# Patient Record
Sex: FEMALE | Race: BLACK OR AFRICAN AMERICAN | NOT HISPANIC OR LATINO | Employment: UNEMPLOYED | URBAN - METROPOLITAN AREA
[De-identification: names, ages, dates, MRNs, and addresses within clinical notes are randomized per-mention and may not be internally consistent; named-entity substitution may affect disease eponyms.]

---

## 2023-06-22 ENCOUNTER — TELEMEDICINE (OUTPATIENT)
Dept: PRIMARY CARE | Facility: CLINIC | Age: 43
End: 2023-06-22
Payer: COMMERCIAL

## 2023-06-22 DIAGNOSIS — R29.818 SUSPECTED SLEEP APNEA: ICD-10-CM

## 2023-06-22 DIAGNOSIS — M54.50 ACUTE RIGHT-SIDED LOW BACK PAIN WITHOUT SCIATICA: Primary | ICD-10-CM

## 2023-06-22 PROCEDURE — 99214 OFFICE O/P EST MOD 30 MIN: CPT | Performed by: STUDENT IN AN ORGANIZED HEALTH CARE EDUCATION/TRAINING PROGRAM

## 2023-06-22 RX ORDER — METHYLPREDNISOLONE 4 MG/1
TABLET ORAL
Qty: 21 TABLET | Refills: 0 | Status: SHIPPED | OUTPATIENT
Start: 2023-06-22 | End: 2023-06-29

## 2023-06-22 NOTE — PROGRESS NOTES
Subjective   Patient ID: Jody Richardson is a 42 y.o. female who presents for multi issues.    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    Re: back pain - Subacute to chronic onset of low back pain. Mild-moderate in intensity, locks up on occasion. Pain is in lumbar area, with no radiation into the buttock. Used some conservative therapy (NSAIDs, stretching, heat/ice, etc...) but without regularity. Has not yet tried physical therapy.     Re: c/f JOAN - BMI ~30. Feels not well rested. Told she snores and stops breathing when she sleeps; often wakes up during the night and feels a bit short of breath. Requesting sleep study.      PMHx, FHx, Social Hx, Surg Hx personally reviewed at this appointment. No pertinent findings and/or changes from prior (if applicable).     ROS: Denies wt gain/loss f/c HA LoC CP SOB NVDC. See HPI above, and scanned sheet (if applicable). All other systems are reviewed and are without complaint.       Review of Systems    Objective   There were no vitals taken for this visit.    Physical Exam  Gen: Well appearing, AAO x 3.   HEENT: NC/AT. Symmetric pupils on webcam.   Pulm: no evidence of increased work of breathing on webcam  Back: no rash/lesions on her back. Complains of tenderness R lower back and points to this region.  Skin: no blemishes or rashes on webcam     Assessment/Plan   # Suspected JOAN  - sleep study ordered    # back strain  - conservative management with PT, massage therapy, acupuncture and topical creams if amenable  - recommend weight loss with diet and exercise  - trial of medrol dosepak or PRN cyclobenzaprine if necessary  - if above ineffective, will re-evaluate and consider imaging at that time

## 2023-08-21 ENCOUNTER — TELEMEDICINE (OUTPATIENT)
Dept: PRIMARY CARE | Facility: CLINIC | Age: 43
End: 2023-08-21
Payer: COMMERCIAL

## 2023-08-21 DIAGNOSIS — H10.13 ALLERGIC CONJUNCTIVITIS OF BOTH EYES: Primary | ICD-10-CM

## 2023-08-21 PROBLEM — J45.909 ASTHMA (HHS-HCC): Status: ACTIVE | Noted: 2023-08-21

## 2023-08-21 PROBLEM — F41.9 ANXIETY: Status: ACTIVE | Noted: 2023-08-21

## 2023-08-21 PROBLEM — F51.5 NIGHTMARES ASSOCIATED WITH CHRONIC POST-TRAUMATIC STRESS DISORDER: Status: ACTIVE | Noted: 2023-08-21

## 2023-08-21 PROBLEM — F41.1 GAD (GENERALIZED ANXIETY DISORDER): Status: ACTIVE | Noted: 2023-08-21

## 2023-08-21 PROBLEM — F40.01 PANIC DISORDER WITH AGORAPHOBIA: Status: ACTIVE | Noted: 2023-08-21

## 2023-08-21 PROBLEM — F43.12 NIGHTMARES ASSOCIATED WITH CHRONIC POST-TRAUMATIC STRESS DISORDER: Status: ACTIVE | Noted: 2023-08-21

## 2023-08-21 PROBLEM — R46.81 OBSESSIVE-COMPULSIVE BEHAVIOR: Status: ACTIVE | Noted: 2023-08-21

## 2023-08-21 PROBLEM — F43.10 COMPLEX POSTTRAUMATIC STRESS DISORDER: Status: ACTIVE | Noted: 2023-08-21

## 2023-08-21 PROCEDURE — 99441 PR PHYS/QHP TELEPHONE EVALUATION 5-10 MIN: CPT | Performed by: STUDENT IN AN ORGANIZED HEALTH CARE EDUCATION/TRAINING PROGRAM

## 2023-08-21 RX ORDER — SERTRALINE HYDROCHLORIDE 100 MG/1
100 TABLET, FILM COATED ORAL 2 TIMES DAILY
COMMUNITY

## 2023-08-21 RX ORDER — LORAZEPAM 0.5 MG/1
TABLET ORAL
COMMUNITY

## 2023-08-21 RX ORDER — OLOPATADINE HYDROCHLORIDE 1 MG/ML
1 SOLUTION/ DROPS OPHTHALMIC 2 TIMES DAILY PRN
Qty: 5 ML | Refills: 0 | Status: SHIPPED | OUTPATIENT
Start: 2023-08-21 | End: 2023-12-19

## 2023-08-21 RX ORDER — OMALIZUMAB 150 MG/ML
INJECTION, SOLUTION SUBCUTANEOUS
COMMUNITY
Start: 2021-06-03

## 2023-08-21 RX ORDER — FAMOTIDINE 20 MG/1
1 TABLET, FILM COATED ORAL EVERY 12 HOURS
COMMUNITY
Start: 2021-05-12

## 2023-08-21 RX ORDER — ALBUTEROL SULFATE 90 UG/1
POWDER, METERED RESPIRATORY (INHALATION)
COMMUNITY
Start: 2018-05-24

## 2023-08-21 RX ORDER — BUSPIRONE HYDROCHLORIDE 5 MG/1
5 TABLET ORAL 4 TIMES DAILY
COMMUNITY
Start: 2023-08-10

## 2023-08-21 NOTE — PROGRESS NOTES
Subjective   Patient ID: Jody Richardson is a 42 y.o. female who presents for No chief complaint on file..    HPI   See prior notes    Re: allerg conj - complains of dry red itchy eyes every AM. Unable to work webcam, converted to phone visit. Requesting eye drops for this.     Review of Systems    Objective   There were no vitals taken for this visit.    Physical Exam  Phone exam    Assessment/Plan   # Suspected allergic conjunctivitis  - trial of olopatadine

## 2023-08-28 PROBLEM — D50.9 IRON DEFICIENCY ANEMIA: Status: ACTIVE | Noted: 2023-08-28

## 2023-08-28 PROBLEM — L50.9 URTICARIA, UNSPECIFIED: Status: ACTIVE | Noted: 2021-05-07

## 2023-08-28 PROBLEM — Z91.89 WITNESS TO DOMESTIC VIOLENCE: Status: ACTIVE | Noted: 2023-08-28

## 2023-08-28 PROBLEM — F43.21 GRIEF: Status: ACTIVE | Noted: 2023-08-28

## 2023-08-28 PROBLEM — L70.0 CYSTIC ACNE: Status: ACTIVE | Noted: 2023-08-28

## 2023-08-28 PROBLEM — L29.9 PRURITUS, UNSPECIFIED: Status: ACTIVE | Noted: 2021-05-07

## 2023-08-28 PROBLEM — L73.8 BACTERIAL FOLLICULITIS: Status: ACTIVE | Noted: 2021-05-07

## 2023-08-28 PROBLEM — D56.9 THALASSEMIA: Status: ACTIVE | Noted: 2023-08-28

## 2023-08-28 PROBLEM — L82.1 SEBORRHEIC KERATOSIS: Status: ACTIVE | Noted: 2021-05-07

## 2023-08-28 PROBLEM — G25.81 RLS (RESTLESS LEGS SYNDROME): Status: ACTIVE | Noted: 2023-08-28

## 2023-08-28 PROBLEM — L27.0 GENERALIZED SKIN ERUPTION DUE TO DRUGS AND MEDICAMENTS TAKEN INTERNALLY: Status: ACTIVE | Noted: 2021-05-07

## 2023-08-28 PROBLEM — G47.9 SLEEP DIFFICULTIES: Status: ACTIVE | Noted: 2023-08-28

## 2023-08-28 PROBLEM — R21 RASH AND OTHER NONSPECIFIC SKIN ERUPTION: Status: ACTIVE | Noted: 2021-05-07

## 2023-08-28 PROBLEM — D47.2 MONOCLONAL GAMMOPATHY OF UNDETERMINED SIGNIFICANCE: Status: ACTIVE | Noted: 2023-08-28

## 2023-08-28 PROBLEM — F39 MOOD DISORDER (CMS-HCC): Status: ACTIVE | Noted: 2023-08-28

## 2023-08-28 PROBLEM — K76.0 FATTY LIVER: Status: ACTIVE | Noted: 2023-08-28

## 2023-08-28 PROBLEM — M25.50 ARTHRALGIA: Status: ACTIVE | Noted: 2023-08-28

## 2023-08-28 PROBLEM — L68.0 HIRSUTISM: Status: ACTIVE | Noted: 2021-05-07

## 2023-08-28 RX ORDER — LEVOCETIRIZINE DIHYDROCHLORIDE 5 MG/1
2 TABLET, FILM COATED ORAL 2 TIMES DAILY
COMMUNITY

## 2023-08-28 RX ORDER — PRAZOSIN HYDROCHLORIDE 5 MG/1
2 CAPSULE ORAL NIGHTLY
COMMUNITY

## 2023-08-28 RX ORDER — SPIRONOLACTONE AND HYDROCHLOROTHIAZIDE 25; 25 MG/1; MG/1
1 TABLET ORAL
COMMUNITY
Start: 2020-10-13

## 2023-08-28 RX ORDER — TRETINOIN 0.25 MG/G
1 CREAM TOPICAL
COMMUNITY
Start: 2021-03-17

## 2023-09-05 PROBLEM — R07.89 CHEST TIGHTNESS: Status: ACTIVE | Noted: 2023-09-05

## 2023-09-05 PROBLEM — L50.8 CHRONIC URTICARIA: Status: ACTIVE | Noted: 2023-09-05

## 2023-09-05 RX ORDER — HYDROXYZINE HYDROCHLORIDE 25 MG/1
25 TABLET, FILM COATED ORAL 3 TIMES DAILY PRN
COMMUNITY

## 2023-10-17 DIAGNOSIS — L50.9 URTICARIA, UNSPECIFIED: Primary | ICD-10-CM

## 2023-10-17 RX ORDER — DIPHENHYDRAMINE HYDROCHLORIDE 50 MG/ML
50 INJECTION INTRAMUSCULAR; INTRAVENOUS AS NEEDED
Status: CANCELLED | OUTPATIENT
Start: 2023-10-24

## 2023-10-17 RX ORDER — FAMOTIDINE 10 MG/ML
20 INJECTION INTRAVENOUS ONCE AS NEEDED
Status: CANCELLED | OUTPATIENT
Start: 2023-10-24

## 2023-10-17 RX ORDER — ALBUTEROL SULFATE 0.83 MG/ML
3 SOLUTION RESPIRATORY (INHALATION) AS NEEDED
Status: CANCELLED | OUTPATIENT
Start: 2023-10-24

## 2023-10-17 RX ORDER — EPINEPHRINE 0.3 MG/.3ML
0.3 INJECTION SUBCUTANEOUS EVERY 5 MIN PRN
Status: CANCELLED | OUTPATIENT
Start: 2023-10-24

## 2023-10-24 ENCOUNTER — INFUSION (OUTPATIENT)
Dept: INFUSION THERAPY | Facility: CLINIC | Age: 43
End: 2023-10-24
Payer: COMMERCIAL

## 2023-10-24 VITALS
TEMPERATURE: 97.2 F | OXYGEN SATURATION: 97 % | DIASTOLIC BLOOD PRESSURE: 80 MMHG | RESPIRATION RATE: 16 BRPM | SYSTOLIC BLOOD PRESSURE: 118 MMHG | WEIGHT: 166.67 LBS | BODY MASS INDEX: 31.49 KG/M2 | HEART RATE: 78 BPM

## 2023-10-24 DIAGNOSIS — L50.9 URTICARIA, UNSPECIFIED: ICD-10-CM

## 2023-10-24 PROCEDURE — 96372 THER/PROPH/DIAG INJ SC/IM: CPT | Performed by: NURSE PRACTITIONER

## 2023-10-24 RX ORDER — ALBUTEROL SULFATE 0.83 MG/ML
3 SOLUTION RESPIRATORY (INHALATION) AS NEEDED
Status: CANCELLED | OUTPATIENT
Start: 2023-11-21

## 2023-10-24 RX ORDER — FAMOTIDINE 10 MG/ML
20 INJECTION INTRAVENOUS ONCE AS NEEDED
Status: CANCELLED | OUTPATIENT
Start: 2023-11-21

## 2023-10-24 RX ORDER — EPINEPHRINE 0.3 MG/.3ML
0.3 INJECTION SUBCUTANEOUS EVERY 5 MIN PRN
Status: CANCELLED | OUTPATIENT
Start: 2023-11-21

## 2023-10-24 RX ORDER — DIPHENHYDRAMINE HYDROCHLORIDE 50 MG/ML
50 INJECTION INTRAMUSCULAR; INTRAVENOUS AS NEEDED
Status: CANCELLED | OUTPATIENT
Start: 2023-11-21

## 2023-10-24 ASSESSMENT — ENCOUNTER SYMPTOMS
NECK STIFFNESS: 0
DIFFICULTY URINATING: 0
COUGH: 0
FLANK PAIN: 0
DEPRESSION: 1
ABDOMINAL DISTENTION: 0
FREQUENCY: 0
UNEXPECTED WEIGHT CHANGE: 0
CHILLS: 0
MYALGIAS: 0
ABDOMINAL PAIN: 0
SORE THROAT: 0
NAUSEA: 0
HEADACHES: 0
DIZZINESS: 0
CONSTIPATION: 0
WOUND: 0
CONFUSION: 0
BACK PAIN: 0
LIGHT-HEADEDNESS: 0
SCLERAL ICTERUS: 0
PALPITATIONS: 0
VOICE CHANGE: 0
SEIZURES: 0
FATIGUE: 0
RECTAL PAIN: 0
TROUBLE SWALLOWING: 0
SLEEP DISTURBANCE: 0
SHORTNESS OF BREATH: 0
NECK PAIN: 0
HEMATURIA: 0
DIARRHEA: 0
CHEST TIGHTNESS: 0
BLOOD IN STOOL: 0
SPEECH DIFFICULTY: 0
ARTHRALGIAS: 0
HEMOPTYSIS: 0
APPETITE CHANGE: 0
DECREASED CONCENTRATION: 0
HOT FLASHES: 0
DYSURIA: 0
ADENOPATHY: 0
DIAPHORESIS: 0
NUMBNESS: 0
BRUISES/BLEEDS EASILY: 0
LEG SWELLING: 0
EXTREMITY WEAKNESS: 0
FEVER: 0
NERVOUS/ANXIOUS: 1
EYE PROBLEMS: 0
VOMITING: 0
WHEEZING: 0

## 2023-10-24 NOTE — PATIENT INSTRUCTIONS
Today you received: XOLAIR 300MG [id number]  For:   1. Urticaria, unspecified          Please read the  Medication Guide that was given to you and reviewed during todays visit.     (Tell all doctors including dentists that you are taking this medication)     Go to the emergency room or call 911 if:  -You have signs of allergic reaction:   o         Rash, hives, itching.   o         Swollen, blistered, peeling skin.   o         Swelling of face, lips, mouth, tongue or throat.   o         Tightness of chest, trouble breathing, swallowing or talking      Call your doctor:     - If IV / injection site gets red, warm, swollen, itchy or leaks fluid or pus.     (Leave dressing on your IV site for at least 2 hours and keep area clean and dry  - If you get sick or have symptoms of infection or are not feeling well for any reason.    (Wash your hands often, stay away from people who are sick)  - If you have side effects from your medication that do not go away or are bothersome.     (Refer to the teaching your nurse gave you for side effects to call your doctor about)     Common side effects may include:  stuffy nose, headache, feeling tired, muscle aches, upset stomach  - Before receiving any vaccines, Call [facility] at   if:  - You get sick, are on antibiotics, have had a recent vaccine, have surgery or dental work and your doctor wants your visit rescheduled.  - You need to cancel and reschedule your visit for any reason. Call at least 2 days before your visit if you need to cancel.   - Your insurance changes before your next visit.    (We will need to get approval from your new insurance. This can take up to two weeks.)     [facility] is opened Monday thru Friday. We are closed on weekends and holidays.     Voice mail will take your call if the center is closed. If you leave a message please allow 24 hours for a call back during weekdays. If you leave a message on a weekend/holiday,  we will call you back the next business day.

## 2023-10-24 NOTE — PROGRESS NOTES
[facility]   infusion Clinic Note   Date: 2023   Name: Jody Richardson  : 1980   MRN: [id number]         Reason for Visit: Injections (PT HERE FOR XOLAIR 300MG )       Visit Type: INJECTION     Ordered By: DR SINHA   Accompanied by:Self      Diagnosis: Urticaria, unspecified      Allergies:   Allergies as of 10/24/2023 - Reviewed 10/24/2023   Allergen Reaction Noted    Fentanyl Hives and Unknown 2023    Midazolam Hives and Itching 2023        Current Medications has a current medication list which includes the following prescription(s): buspirone, famotidine, hydroxyzine hcl, lorazepam, xolair, prazosin, proair respiclick, sertraline, tretinoin, levocetirizine, olopatadine, and spironolacton-hydrochlorothiaz.          Vitals:  Vitals:    10/24/23 1700   BP: 118/80   Pulse: 78   Resp: 16   Temp: 36.2 °C (97.2 °F)   SpO2: 97%   Weight: 75.6 kg (166 lb 10.7 oz)          Infusion Pre-procedure Checklist:   Allergies reviewed: yes   Medications reviewed: yes     Previous reaction to current treatment: No      Assess patient for the concerns below. Document provider notification as appropriate.  - Active or recent infection with/without current antibiotic use: no  - Recent or planned invasive dental work: no  - Recent or planned surgeries: no  - Recently received or plans to receive vaccinations: no  - Has treatment related toxicities: no  - Is pregnant:  no    - Does the patient meet criteria to treat? Yes    Provider notified? Not applicable      Pain: 0    Is the pain different from normal: n/a   Is the pain tolerable: n/a   Is your Doctor aware: n/a       Labs: None      Fall Risk Screening: Lorenzo Fall Risk  History of Falling, Immediate or Within 3 Months: Yes  Secondary Diagnosis: Yes  Ambulatory Aid: Walks without aid/bedrest/nurse assist  Intravenous Therapy/Heparin Lock: No  Gait/Transferring: Normal/bedrest/immobile  Mental Status: Oriented to own  ability  Ventura Fall Risk Score: 40       Review of Systems   Constitutional:  Negative for appetite change, chills, diaphoresis, fatigue, fever and unexpected weight change.   HENT:   Negative for hearing loss, lump/mass, mouth sores, nosebleeds, sore throat, tinnitus, trouble swallowing and voice change.    Eyes:  Negative for eye problems and icterus.   Respiratory:  Negative for chest tightness, cough, hemoptysis, shortness of breath and wheezing.    Cardiovascular:  Negative for chest pain, leg swelling and palpitations.   Gastrointestinal:  Negative for abdominal distention, abdominal pain, blood in stool, constipation, diarrhea, nausea, rectal pain and vomiting.   Endocrine: Negative for hot flashes.   Genitourinary:  Negative for bladder incontinence, difficulty urinating, dyspareunia, dysuria, frequency, hematuria, menstrual problem, nocturia, pelvic pain, vaginal bleeding and vaginal discharge.    Musculoskeletal:  Negative for arthralgias, back pain, flank pain, gait problem, myalgias, neck pain and neck stiffness.   Skin:  Negative for itching, rash and wound.   Neurological:  Negative for dizziness, extremity weakness, gait problem, headaches, light-headedness, numbness, seizures and speech difficulty.   Hematological:  Negative for adenopathy. Does not bruise/bleed easily.   Psychiatric/Behavioral:  Positive for depression. Negative for confusion, decreased concentration, sleep disturbance and suicidal ideas. The patient is nervous/anxious.          Infusion Readiness:   Assessment Concerns Related to Infusion: No  Provider notified: n/a      Document Below Only If Indicated:   New Patient Education:  N/A (returning patient for continuation of therapy. Ongoing education provided as needed.)       Drug Specific Questions:  Omalizumab  (XOLAIR)  DOES THE PT HAVE A LATEX ALLERGY? NO    HAS THE PT HAD ANY SUDDEN BREATHING PROBLEMS (BRONCHOSPASMS)? NO    HAS THE PT EVER HAD A SEVERE ALLERGIC REACTION /  ANAPHYLAXIS? NO    DOES THE PT HAVE ANY CURRENT OR RECENT PARASITIC INFECTIONS? NO    DOES THE PT HAVE A DIAGNOSIS OR HISTORY OF CANCER? NO     Weight Based Drug Calculations:  WEIGHT BASED DRUGS: NOT APPLICABLE           Initiated By: Martinez Bass RN   Time: 5:19 PM     We administered omalizumab.

## 2023-12-15 ENCOUNTER — APPOINTMENT (OUTPATIENT)
Dept: INFUSION THERAPY | Facility: CLINIC | Age: 43
End: 2023-12-15
Payer: COMMERCIAL

## 2023-12-15 DIAGNOSIS — J45.909 ASTHMA, UNSPECIFIED ASTHMA SEVERITY, UNSPECIFIED WHETHER COMPLICATED, UNSPECIFIED WHETHER PERSISTENT (HHS-HCC): ICD-10-CM

## 2023-12-18 ENCOUNTER — APPOINTMENT (OUTPATIENT)
Dept: INFUSION THERAPY | Facility: CLINIC | Age: 43
End: 2023-12-18
Payer: COMMERCIAL

## 2023-12-18 RX ORDER — ALBUTEROL SULFATE 90 UG/1
2 AEROSOL, METERED RESPIRATORY (INHALATION) EVERY 4 HOURS PRN
Qty: 8 G | Refills: 5 | Status: SHIPPED | OUTPATIENT
Start: 2023-12-18 | End: 2024-12-17

## 2023-12-31 DIAGNOSIS — H10.9 CONJUNCTIVITIS OF LEFT EYE, UNSPECIFIED CONJUNCTIVITIS TYPE: Primary | ICD-10-CM

## 2023-12-31 RX ORDER — OFLOXACIN 3 MG/ML
1 SOLUTION/ DROPS OPHTHALMIC 4 TIMES DAILY
Qty: 5 ML | Refills: 0 | Status: SHIPPED | OUTPATIENT
Start: 2023-12-31 | End: 2024-01-07

## 2024-01-12 ENCOUNTER — INFUSION (OUTPATIENT)
Dept: INFUSION THERAPY | Facility: CLINIC | Age: 44
End: 2024-01-12
Payer: COMMERCIAL

## 2024-01-12 VITALS
OXYGEN SATURATION: 98 % | WEIGHT: 167.55 LBS | BODY MASS INDEX: 31.66 KG/M2 | SYSTOLIC BLOOD PRESSURE: 120 MMHG | RESPIRATION RATE: 18 BRPM | DIASTOLIC BLOOD PRESSURE: 87 MMHG | TEMPERATURE: 98.3 F | HEART RATE: 76 BPM

## 2024-01-12 DIAGNOSIS — L50.9 URTICARIA, UNSPECIFIED: ICD-10-CM

## 2024-01-12 PROCEDURE — 96372 THER/PROPH/DIAG INJ SC/IM: CPT | Performed by: NURSE PRACTITIONER

## 2024-01-12 RX ORDER — EPINEPHRINE 0.3 MG/.3ML
0.3 INJECTION SUBCUTANEOUS EVERY 5 MIN PRN
Status: CANCELLED | OUTPATIENT
Start: 2024-01-16

## 2024-01-12 RX ORDER — FAMOTIDINE 10 MG/ML
20 INJECTION INTRAVENOUS ONCE AS NEEDED
Status: CANCELLED | OUTPATIENT
Start: 2024-01-16

## 2024-01-12 RX ORDER — DIPHENHYDRAMINE HYDROCHLORIDE 50 MG/ML
50 INJECTION INTRAMUSCULAR; INTRAVENOUS AS NEEDED
Status: CANCELLED | OUTPATIENT
Start: 2024-01-16

## 2024-01-12 RX ORDER — ALBUTEROL SULFATE 0.83 MG/ML
3 SOLUTION RESPIRATORY (INHALATION) AS NEEDED
Status: CANCELLED | OUTPATIENT
Start: 2024-01-16

## 2024-01-12 ASSESSMENT — ENCOUNTER SYMPTOMS
HOT FLASHES: 0
UNEXPECTED WEIGHT CHANGE: 0
DIZZINESS: 0
SEIZURES: 0
LEG SWELLING: 0
SLEEP DISTURBANCE: 0
WHEEZING: 0
DIFFICULTY URINATING: 0
PALPITATIONS: 0
RECTAL PAIN: 0
SHORTNESS OF BREATH: 0
APPETITE CHANGE: 0
FATIGUE: 0
FREQUENCY: 0
DIAPHORESIS: 0
HEADACHES: 0
BRUISES/BLEEDS EASILY: 0
NUMBNESS: 0
WOUND: 0
CONFUSION: 0
VOMITING: 0
DYSURIA: 0
SPEECH DIFFICULTY: 0
MYALGIAS: 0
SORE THROAT: 0
NECK PAIN: 0
DEPRESSION: 1
TROUBLE SWALLOWING: 0
EXTREMITY WEAKNESS: 0
BACK PAIN: 0
SCLERAL ICTERUS: 0
HEMATURIA: 0
EYE PROBLEMS: 0
ABDOMINAL PAIN: 0
LIGHT-HEADEDNESS: 0
VOICE CHANGE: 0
NAUSEA: 0
CHILLS: 0
DECREASED CONCENTRATION: 0
COUGH: 0
BLOOD IN STOOL: 0
CONSTIPATION: 0
HEMOPTYSIS: 0
NECK STIFFNESS: 0
ADENOPATHY: 0
CHEST TIGHTNESS: 0
FEVER: 0
ARTHRALGIAS: 0
ABDOMINAL DISTENTION: 0
FLANK PAIN: 0
DIARRHEA: 0
NERVOUS/ANXIOUS: 1

## 2024-01-12 NOTE — PROGRESS NOTES
[facility]   infusion Clinic Note   Date: 2024   Name: Jody Richardson  : 1980   MRN: [id number]         Reason for Visit: Injections (Monthly Xolair 300mg(total) subcutaneous injections)       Visit Type: INJECTION     Ordered By:  Princess Sebastian NICOLAS   Accompanied by: Mother      Diagnosis: Urticaria, unspecified      Allergies:   Allergies as of 2024 - Reviewed 2024   Allergen Reaction Noted    Fentanyl Hives and Unknown 2023    Midazolam Hives and Itching 2023        Current Medications has a current medication list which includes the following prescription(s): albuterol, buspirone, famotidine, hydroxyzine hcl, levocetirizine, lorazepam, olopatadine, xolair, prazosin, proair respiclick, sertraline, spironolacton-hydrochlorothiaz, and tretinoin.          Vitals:  Vitals:    24 1400 24 1450   BP: 118/82 120/87   BP Location: Left arm Left arm   Patient Position: Sitting Sitting   BP Cuff Size: Adult Adult   Pulse: 84 76   Resp: 18 18   Temp: 36.8 °C (98.3 °F) 36.8 °C (98.3 °F)   TempSrc: Temporal Temporal   SpO2: 98% 98%   Weight: 76 kg (167 lb 8.8 oz)           Infusion Pre-procedure Checklist:   Allergies reviewed: yes   Medications reviewed: yes     Previous reaction to current treatment: No      Assess patient for the concerns below. Document provider notification as appropriate.  - Active or recent infection with/without current antibiotic use: no  - Recent or planned invasive dental work: no  - Recent or planned surgeries: no  - Recently received or plans to receive vaccinations: no  - Has treatment related toxicities: no  - Is pregnant:  no    - Does the patient meet criteria to treat? Yes    Provider notified? Not applicable      Pain: 0    Is the pain different from normal: n/a   Is the pain tolerable: n/a   Is your Doctor aware: n/a       Labs: None      Fall Risk Screening: Lorenzo Fall Risk  History of Falling, Immediate or  Within 3 Months: Yes (, lost balance)  Ambulatory Aid: Walks without aid/bedrest/nurse assist  Intravenous Therapy/Heparin Lock: No  Gait/Transferring: Normal/bedrest/immobile  Mental Status: Oriented to own ability       Review of Systems   Constitutional:  Negative for appetite change, chills, diaphoresis, fatigue, fever and unexpected weight change.   HENT:   Negative for hearing loss, lump/mass, mouth sores, nosebleeds, sore throat, tinnitus, trouble swallowing and voice change.    Eyes:  Negative for eye problems and icterus.   Respiratory:  Negative for chest tightness, cough, hemoptysis, shortness of breath and wheezing.    Cardiovascular:  Negative for chest pain, leg swelling and palpitations.   Gastrointestinal:  Negative for abdominal distention, abdominal pain, blood in stool, constipation, diarrhea, nausea, rectal pain and vomiting.   Endocrine: Negative for hot flashes.   Genitourinary:  Negative for bladder incontinence, difficulty urinating, dyspareunia, dysuria, frequency, hematuria, menstrual problem, nocturia, pelvic pain, vaginal bleeding and vaginal discharge.    Musculoskeletal:  Negative for arthralgias, back pain, flank pain, gait problem, myalgias, neck pain and neck stiffness.   Skin:  Negative for itching, rash and wound.        Scattered itching   Scattered hives   Neurological:  Negative for dizziness, extremity weakness, gait problem, headaches, light-headedness, numbness, seizures and speech difficulty.   Hematological:  Negative for adenopathy. Does not bruise/bleed easily.   Psychiatric/Behavioral:  Positive for depression. Negative for confusion, decreased concentration, sleep disturbance and suicidal ideas. The patient is nervous/anxious.          Infusion Readiness:   Assessment Concerns Related to Infusion: No  Provider notified: n/a      Document Below Only If Indicated:   New Patient Education:  N/A (returning patient for continuation of therapy. Ongoing education  provided as needed.)       Drug Specific Questions:  Omalizumab  (XOLAIR)  DOES THE PT HAVE A LATEX ALLERGY? NO    HAS THE PT HAD ANY SUDDEN BREATHING PROBLEMS (BRONCHOSPASMS)? NO    HAS THE PT EVER HAD A SEVERE ALLERGIC REACTION / ANAPHYLAXIS? NO    DOES THE PT HAVE ANY CURRENT OR RECENT PARASITIC INFECTIONS? NO    DOES THE PT HAVE A DIAGNOSIS OR HISTORY OF CANCER? NO     Weight Based Drug Calculations:  WEIGHT BASED DRUGS: NOT APPLICABLE           Initiated By: Love Willis LPN   Time: 3:46 PM     We administered omalizumab.

## 2024-01-12 NOTE — PATIENT INSTRUCTIONS
Today :We administered omalizumab.  Xolair 300mg(total) subcutaneous injections   Xolair 150mg left upper arm   Xolair 150mg right upper arm    For:   1. Urticaria, unspecified       Your next appointment is due in:   1 month       Please read the  Medication Guide that was given to you.     (Tell all doctors including dentists that you are taking this medication)     Go to the emergency room or call 911 if:  -You have signs of allergic reaction:   -Rash, hives, itching.   -Swollen, blistered, peeling skin.   -Swelling of face, lips, mouth, tongue or throat.   -Tightness of chest, trouble breathing, swallowing or talking     Call your doctor:  - If injection sites gets red, warm, swollen, itchy or leaks fluid or pus.     (Leave band aids on your injection sites for at least 2 hours and keep area clean and dry  - If you get sick or have symptoms of infection or are not feeling well for any reason.    (Wash your hands often, stay away from people who are sick)  - If you have side effects from your medication that do not go away or are bothersome.     (Refer to the teaching your nurse gave you for side effects to call your doctor about)    - Common side effects may include:  stuffy nose, headache, feeling tired, muscle aches, upset stomach  - Before receiving any vaccines     - Call [facility] at   If:  - You get sick, are on antibiotics, have had a recent vaccine, have surgery or dental work and your doctor wants your visit rescheduled.  - You need to cancel and reschedule your visit for any reason. Call at least 2 days before your visit if you need to cancel.   - Your insurance changes before your next visit.    (We will need to get approval from your new insurance. This can take up to two weeks.)     [facility] is opened Monday thru Friday. We are closed on weekends and holidays.   Voice mail will take your call if the center is closed. If you leave a message please allow 24  hours for a call back during weekdays. If you leave a message on a weekend/holiday, we will call you back the next business day.

## 2024-02-27 ENCOUNTER — INFUSION (OUTPATIENT)
Dept: INFUSION THERAPY | Facility: CLINIC | Age: 44
End: 2024-02-27
Payer: COMMERCIAL

## 2024-02-27 VITALS
OXYGEN SATURATION: 97 % | RESPIRATION RATE: 16 BRPM | TEMPERATURE: 97.6 F | WEIGHT: 171.52 LBS | SYSTOLIC BLOOD PRESSURE: 109 MMHG | HEART RATE: 76 BPM | BODY MASS INDEX: 32.41 KG/M2 | DIASTOLIC BLOOD PRESSURE: 74 MMHG

## 2024-02-27 DIAGNOSIS — L50.9 URTICARIA, UNSPECIFIED: ICD-10-CM

## 2024-02-27 PROCEDURE — 96372 THER/PROPH/DIAG INJ SC/IM: CPT | Performed by: NURSE PRACTITIONER

## 2024-02-27 RX ORDER — EPINEPHRINE 0.3 MG/.3ML
0.3 INJECTION SUBCUTANEOUS EVERY 5 MIN PRN
Status: CANCELLED | OUTPATIENT
Start: 2024-03-08

## 2024-02-27 RX ORDER — ALBUTEROL SULFATE 0.83 MG/ML
3 SOLUTION RESPIRATORY (INHALATION) AS NEEDED
Status: CANCELLED | OUTPATIENT
Start: 2024-03-08

## 2024-02-27 RX ORDER — DIPHENHYDRAMINE HYDROCHLORIDE 50 MG/ML
50 INJECTION INTRAMUSCULAR; INTRAVENOUS AS NEEDED
Status: CANCELLED | OUTPATIENT
Start: 2024-03-08

## 2024-02-27 RX ORDER — FAMOTIDINE 10 MG/ML
20 INJECTION INTRAVENOUS ONCE AS NEEDED
Status: CANCELLED | OUTPATIENT
Start: 2024-03-08

## 2024-02-27 ASSESSMENT — ENCOUNTER SYMPTOMS
SORE THROAT: 0
WHEEZING: 0
SHORTNESS OF BREATH: 0
TROUBLE SWALLOWING: 0
DEPRESSION: 1
WOUND: 0
VOMITING: 0
NUMBNESS: 0
LEG SWELLING: 0
APPETITE CHANGE: 0
ARTHRALGIAS: 0
PALPITATIONS: 0
FEVER: 0
NERVOUS/ANXIOUS: 1
NAUSEA: 0
DIFFICULTY URINATING: 0
HEADACHES: 0
MYALGIAS: 0
COUGH: 0
CHEST TIGHTNESS: 0
CHILLS: 0
FREQUENCY: 0
EYE PROBLEMS: 0
BRUISES/BLEEDS EASILY: 0
SLEEP DISTURBANCE: 1
CONSTIPATION: 0
FATIGUE: 0
LIGHT-HEADEDNESS: 0
CONFUSION: 0
UNEXPECTED WEIGHT CHANGE: 0
DIZZINESS: 0
DIARRHEA: 0

## 2024-02-27 NOTE — PATIENT INSTRUCTIONS
Today :We administered omalizumab.     For:   1. Urticaria, unspecified         Your next appointment is due in:  28 DAYS         Please read the  Medication Guide that was given to you and reviewed during todays visit.     (Tell all doctors including dentists that you are taking this medication)     Go to the emergency room or call 911 if:  -You have signs of allergic reaction:   -Rash, hives, itching.   -Swollen, blistered, peeling skin.   -Swelling of face, lips, mouth, tongue or throat.   -Tightness of chest, trouble breathing, swallowing or talking     Call your doctor:  - If IV / injection site gets red, warm, swollen, itchy or leaks fluid or pus.     (Leave dressing on your IV site for at least 2 hours and keep area clean and dry  - If you get sick or have symptoms of infection or are not feeling well for any reason.    (Wash your hands often, stay away from people who are sick)  - If you have side effects from your medication that do not go away or are bothersome.     (Refer to the teaching your nurse gave you for side effects to call your doctor about)    - Common side effects may include:  stuffy nose, headache, feeling tired, muscle aches, upset stomach  - Before receiving any vaccines     - Call the [facility] at   If:  - You get sick, are on antibiotics, have had a recent vaccine, have surgery or dental work and your doctor wants your visit rescheduled.  - You need to cancel and reschedule your visit for any reason. Call at least 2 days before your visit if you need to cancel.   - Your insurance changes before your next visit.    (We will need to get approval from your new insurance. This can take up to two weeks.)     [facility] is opened Monday thru Friday. We are closed on weekends and holidays.   Voice mail will take your call if the center is closed. If you leave a message please allow 24 hours for a call back during weekdays. If you leave a message on a  weekend/holiday, we will call you back the next business day.

## 2024-02-27 NOTE — PROGRESS NOTES
[facility]   infusion Clinic Note   Date: 2024   Name: Jody Richardson  : 1980   MRN: [id number]         Reason for Visit: Injections (PT HERE FOR Q 28 DAY XOLAIR INJECTION)       Visit Type: INJECTION     Ordered By:  Princess Sebastian NICOLAS   Accompanied by: Mother      Diagnosis: Urticaria, unspecified      Allergies:   Allergies as of 2024 - Reviewed 2024   Allergen Reaction Noted    Fentanyl Hives and Unknown 2023    Midazolam Hives and Itching 2023        Current Medications has a current medication list which includes the following prescription(s): albuterol, buspirone, famotidine, hydroxyzine hcl, levocetirizine, lorazepam, olopatadine, xolair, prazosin, proair respiclick, sertraline, spironolacton-hydrochlorothiaz, and tretinoin.          Vitals:  Vitals:    24 1650 24 1735   BP: 114/84 109/74   Pulse: 87 76   Resp: 16 16   Temp: 36.2 °C (97.2 °F) 36.4 °C (97.6 °F)   SpO2: 96% 97%   Weight: 77.8 kg (171 lb 8.3 oz)           Infusion Pre-procedure Checklist:   Allergies reviewed: yes   Medications reviewed: yes     Previous reaction to current treatment: No      Assess patient for the concerns below. Document provider notification as appropriate.  - Active or recent infection with/without current antibiotic use: no  - Recent or planned invasive dental work: no  - Recent or planned surgeries: no  - Recently received or plans to receive vaccinations: no  - Has treatment related toxicities: no  - Is pregnant:  no    - Does the patient meet criteria to treat? Yes    Provider notified? Not applicable      Pain: 0    Is the pain different from normal: n/a   Is the pain tolerable: n/a   Is your Doctor aware: n/a       Labs: None      Fall Risk Screening: Ventura Fall Risk  History of Falling, Immediate or Within 3 Months: No  Secondary Diagnosis: No  Ambulatory Aid: Walks without aid/bedrest/nurse assist  Intravenous Therapy/Heparin Lock:  No  Gait/Transferring: Normal/bedrest/immobile  Mental Status: Oriented to own ability  Ventura Fall Risk Score: 0       Review of Systems   Constitutional:  Negative for appetite change, chills, fatigue, fever and unexpected weight change.   HENT:   Negative for hearing loss, mouth sores, nosebleeds, sore throat, tinnitus and trouble swallowing.    Eyes:  Negative for eye problems.   Respiratory:  Negative for chest tightness, cough, shortness of breath and wheezing.    Cardiovascular:  Negative for chest pain, leg swelling and palpitations.   Gastrointestinal:  Negative for constipation, diarrhea, nausea and vomiting.   Genitourinary:  Negative for bladder incontinence, difficulty urinating and frequency.    Musculoskeletal:  Negative for arthralgias, gait problem and myalgias.   Skin:  Negative for itching, rash and wound.        Scattered itching  Scattered hives PT STATES SHE HAS THIS AT BASELINE.    Neurological:  Negative for dizziness, gait problem, headaches, light-headedness and numbness.   Hematological:  Does not bruise/bleed easily.   Psychiatric/Behavioral:  Positive for depression and sleep disturbance. Negative for confusion and suicidal ideas. The patient is nervous/anxious.         MANAGED WITH RX.          Infusion Readiness:   Assessment Concerns Related to Infusion: No  Provider notified: n/a      Document Below Only If Indicated:   New Patient Education:  N/A (returning patient for continuation of therapy. Ongoing education provided as needed.)       Drug Specific Questions:  Omalizumab  (XOLAIR)  DOES THE PT HAVE A LATEX ALLERGY? NO    HAS THE PT HAD ANY SUDDEN BREATHING PROBLEMS (BRONCHOSPASMS)? NO    HAS THE PT EVER HAD A SEVERE ALLERGIC REACTION / ANAPHYLAXIS? NO    DOES THE PT HAVE ANY CURRENT OR RECENT PARASITIC INFECTIONS? NO    DOES THE PT HAVE A DIAGNOSIS OR HISTORY OF CANCER? NO     Weight Based Drug Calculations:  WEIGHT BASED DRUGS: NOT APPLICABLE           Initiated By: Mary CANO  BILLIE Chand   Time: 6:37 PM     We administered omalizumab.

## 2024-02-28 ENCOUNTER — TELEPHONE (OUTPATIENT)
Dept: ALLERGY | Facility: HOSPITAL | Age: 44
End: 2024-02-28
Payer: COMMERCIAL

## 2024-02-28 NOTE — TELEPHONE ENCOUNTER
Patient called reporting break out hives before and still continuing after her Xolair shot yesterday. Per patient she wasn't taking any antihistamines. Per Dr. Cortes she can take 10 mg of Cetrizine BID. Instructed patient to call back if still having a break out. FUV scheduled at request of patient.

## 2024-03-11 ENCOUNTER — APPOINTMENT (OUTPATIENT)
Dept: ALLERGY | Facility: CLINIC | Age: 44
End: 2024-03-11
Payer: COMMERCIAL

## 2024-03-29 ENCOUNTER — APPOINTMENT (OUTPATIENT)
Dept: INFUSION THERAPY | Facility: CLINIC | Age: 44
End: 2024-03-29
Payer: COMMERCIAL

## 2024-04-30 ENCOUNTER — INFUSION (OUTPATIENT)
Dept: INFUSION THERAPY | Facility: CLINIC | Age: 44
End: 2024-04-30
Payer: COMMERCIAL

## 2024-04-30 VITALS
RESPIRATION RATE: 18 BRPM | BODY MASS INDEX: 31.52 KG/M2 | WEIGHT: 166.84 LBS | OXYGEN SATURATION: 98 % | DIASTOLIC BLOOD PRESSURE: 82 MMHG | HEART RATE: 82 BPM | TEMPERATURE: 98 F | SYSTOLIC BLOOD PRESSURE: 129 MMHG

## 2024-04-30 DIAGNOSIS — L50.9 URTICARIA, UNSPECIFIED: Primary | ICD-10-CM

## 2024-04-30 PROCEDURE — 96372 THER/PROPH/DIAG INJ SC/IM: CPT | Performed by: NURSE PRACTITIONER

## 2024-04-30 RX ORDER — EPINEPHRINE 0.3 MG/.3ML
0.3 INJECTION SUBCUTANEOUS EVERY 5 MIN PRN
Status: CANCELLED | OUTPATIENT
Start: 2024-05-21

## 2024-04-30 RX ORDER — FAMOTIDINE 10 MG/ML
20 INJECTION INTRAVENOUS ONCE AS NEEDED
Status: CANCELLED | OUTPATIENT
Start: 2024-05-21

## 2024-04-30 RX ORDER — ALBUTEROL SULFATE 0.83 MG/ML
3 SOLUTION RESPIRATORY (INHALATION) AS NEEDED
Status: CANCELLED | OUTPATIENT
Start: 2024-05-21

## 2024-04-30 RX ORDER — DIPHENHYDRAMINE HYDROCHLORIDE 50 MG/ML
50 INJECTION INTRAMUSCULAR; INTRAVENOUS AS NEEDED
Status: CANCELLED | OUTPATIENT
Start: 2024-05-21

## 2024-04-30 ASSESSMENT — ENCOUNTER SYMPTOMS
GASTROINTESTINAL NEGATIVE: 1
NEUROLOGICAL NEGATIVE: 1
ABDOMINAL DISTENTION: 0
RESPIRATORY NEGATIVE: 1
CARDIOVASCULAR NEGATIVE: 1
CONSTITUTIONAL NEGATIVE: 1
MUSCULOSKELETAL NEGATIVE: 1

## 2024-04-30 NOTE — PROGRESS NOTES
[facility]   Infusion Clinic Note   Date: 2024   Name: Jody Richardson  : 1980   MRN: [id number]         Reason for Visit: Injection Follow-up (XOLAIR EVERY 28 DAYS)         Visit Type: INJECTION       Ordered By: DR. SINHA      Accompanied by:Self      Diagnosis: Urticaria, unspecified       Allergies:   Allergies as of 2024 - Reviewed 2024   Allergen Reaction Noted    Fentanyl Hives and Unknown 2023    Midazolam Hives and Itching 2023         Current Medications has a current medication list which includes the following prescription(s): albuterol, buspirone, famotidine, hydroxyzine hcl, levocetirizine, lorazepam, olopatadine, xolair, prazosin, proair respiclick, sertraline, spironolacton-hydrochlorothiaz, and tretinoin.       Vitals:   Vitals:    24 1318 24 1359   BP: 116/87 129/82   Pulse: 68 82   Resp: 18 18   Temp: 36.2 °C (97.2 °F) 36.7 °C (98 °F)   SpO2: 97% 98%   Weight:  75.7 kg (166 lb 13.5 oz)             Infusion Pre-procedure Checklist:   - Allergies reviewed: yes   - Medications reviewed: yes       - Previous reaction to current treatment: no      Assess patient for the concerns below. Document provider notification as appropriate.  - Active or recent infection with/without current antibiotic use: no  - Recent or planned invasive dental work: no  - Recent or planned surgeries: no  - Recently received or plans to receive vaccinations: no  - Has treatment related toxicities: no  - Is pregnant:  no      Pain: 0   - Is the pain different from normal: n/a   - Is the pain tolerable: n/a   - Is your Doctor aware:  n/a      Labs: N/A         Fall Risk Screening: Lorenzo Fall Risk  History of Falling, Immediate or Within 3 Months: No  Secondary Diagnosis: No  Ambulatory Aid: Walks without aid/bedrest/nurse assist  Intravenous Therapy/Heparin Lock: No  Gait/Transferring: Normal/bedrest/immobile  Mental Status: Oriented to own  ability  Ventura Fall Risk Score: 0       Review Of Systems:  Review of Systems   Constitutional: Negative.    HENT:  Negative.     Respiratory: Negative.     Cardiovascular: Negative.    Gastrointestinal: Negative.  Negative for abdominal distention.   Genitourinary: Negative.     Musculoskeletal: Negative.    Skin: Negative.    Neurological: Negative.          Infusion Readiness:   - Assessment Concerns Related to Infusion: No  - Provider notified: n/a      Document Below Only If Indicated:   New Patient Education:    N/A (returning patient for continuation of therapy. Ongoing education provided as needed.)        Treatment Conditions & Drug Specific Questions:    Omalizumab  (XOLAIR)    (Unless otherwise specified on patient specific therapy plan):     DRUG SPECIFIC QUESTIONS:  Does the patient have a latex allergy? No  (Needle cap contains rubber (a derivative of latex); may cause allergic reactions for individuals sensitive to latex)      REMINDER:  May be weight based.     Recommended Vitals/Observation:  Vitals:      Induction: First 3 Injections: Take vital signs prior to injection and then 30, 60, 90 and 120 min post injection      Maintenance: Take vital signs prior to injection and then 30 minutes post injection.   Observation:      Induction: Observe patient for a minimum of 2 hours following administration of FIRST THREE dose     Maintenance: 30 minutes for the subsequent doses.        Weight Based Drug Calculations:    WEIGHT BASED DRUGS: NOT APPLICABLE / FLAT DOSE          Initiated By: LUCIA Miller   Time: 2:00 PM     We administered omalizumab.

## 2024-05-29 ENCOUNTER — INFUSION (OUTPATIENT)
Dept: INFUSION THERAPY | Facility: CLINIC | Age: 44
End: 2024-05-29
Payer: COMMERCIAL

## 2024-05-29 VITALS
SYSTOLIC BLOOD PRESSURE: 111 MMHG | HEART RATE: 70 BPM | BODY MASS INDEX: 32.49 KG/M2 | OXYGEN SATURATION: 99 % | DIASTOLIC BLOOD PRESSURE: 75 MMHG | RESPIRATION RATE: 18 BRPM | WEIGHT: 171.96 LBS | TEMPERATURE: 96.4 F

## 2024-05-29 DIAGNOSIS — L50.9 URTICARIA, UNSPECIFIED: ICD-10-CM

## 2024-05-29 PROCEDURE — 96372 THER/PROPH/DIAG INJ SC/IM: CPT | Performed by: NURSE PRACTITIONER

## 2024-05-29 RX ORDER — ALBUTEROL SULFATE 0.83 MG/ML
3 SOLUTION RESPIRATORY (INHALATION) AS NEEDED
OUTPATIENT
Start: 2024-06-25

## 2024-05-29 RX ORDER — FAMOTIDINE 10 MG/ML
20 INJECTION INTRAVENOUS ONCE AS NEEDED
OUTPATIENT
Start: 2024-06-25

## 2024-05-29 RX ORDER — DIPHENHYDRAMINE HYDROCHLORIDE 50 MG/ML
50 INJECTION INTRAMUSCULAR; INTRAVENOUS AS NEEDED
OUTPATIENT
Start: 2024-06-25

## 2024-05-29 RX ORDER — EPINEPHRINE 0.3 MG/.3ML
0.3 INJECTION SUBCUTANEOUS EVERY 5 MIN PRN
OUTPATIENT
Start: 2024-06-25

## 2024-05-29 ASSESSMENT — ENCOUNTER SYMPTOMS
DIFFICULTY URINATING: 0
CHEST TIGHTNESS: 0
DIZZINESS: 0
WHEEZING: 0
FREQUENCY: 0
DEPRESSION: 1
EYE PROBLEMS: 0
VOMITING: 0
TROUBLE SWALLOWING: 0
VOICE CHANGE: 0
APPETITE CHANGE: 0
LIGHT-HEADEDNESS: 0
FATIGUE: 0
ARTHRALGIAS: 0
CONFUSION: 0
FEVER: 0
SHORTNESS OF BREATH: 0
NEUROLOGICAL NEGATIVE: 1
HEADACHES: 0
SLEEP DISTURBANCE: 0
DIARRHEA: 0
CHILLS: 0
PALPITATIONS: 0
UNEXPECTED WEIGHT CHANGE: 0
CONSTIPATION: 0
NERVOUS/ANXIOUS: 1
NUMBNESS: 0
COUGH: 0
SORE THROAT: 0
MYALGIAS: 0
LEG SWELLING: 0
WOUND: 0
NAUSEA: 0

## 2024-05-29 NOTE — PROGRESS NOTES
[facility]   Infusion Clinic Note   Date: May 29, 2024   Name: Jody Richardson  : 1980   MRN: [id number]         Reason for Visit: Injections (PT HERE FOR Q28 DAY XOLAIR 300 MG INFUSION)         Visit Type: INJECTION       Ordered By: DR. SINHA      Accompanied by:Self      Diagnosis: Urticaria, unspecified       Allergies:   Allergies as of 2024 - Reviewed 2024   Allergen Reaction Noted    Fentanyl Hives and Unknown 2023    Midazolam Hives and Itching 2023         Current Medications has a current medication list which includes the following prescription(s): albuterol, buspirone, famotidine, hydroxyzine hcl, levocetirizine, lorazepam, olopatadine, xolair, prazosin, proair respiclick, sertraline, spironolacton-hydrochlorothiaz, and tretinoin.       Vitals:   Vitals:    24 1649   BP: 128/83   Pulse: 73   Resp: 16   Temp: 36 °C (96.8 °F)   SpO2: 97%   Weight: 78 kg (171 lb 15.3 oz)             Infusion Pre-procedure Checklist:   - Allergies reviewed: yes   - Medications reviewed: yes       - Previous reaction to current treatment: no      Assess patient for the concerns below. Document provider notification as appropriate.  - Active or recent infection with/without current antibiotic use: no  - Recent or planned invasive dental work: no  - Recent or planned surgeries: no  - Recently received or plans to receive vaccinations: no  - Has treatment related toxicities: no  - Is pregnant:  no      Pain: 0   - Is the pain different from normal: n/a   - Is the pain tolerable: n/a   - Is your Doctor aware:  n/a      Labs: N/A         Fall Risk Screening: Ventura Fall Risk  History of Falling, Immediate or Within 3 Months: No  Secondary Diagnosis: No  Ambulatory Aid: Walks without aid/bedrest/nurse assist  Intravenous Therapy/Heparin Lock: Yes  Gait/Transferring: Normal/bedrest/immobile  Mental Status: Oriented to own ability  Ventura Fall Risk Score: 20       Review Of  Systems:  Review of Systems   Constitutional:  Negative for appetite change, chills, fatigue, fever and unexpected weight change.   HENT:   Negative for hearing loss, mouth sores, nosebleeds, sore throat, tinnitus, trouble swallowing and voice change.    Eyes:  Negative for eye problems.   Respiratory:  Negative for chest tightness, cough, shortness of breath and wheezing.    Cardiovascular:  Negative for chest pain, leg swelling and palpitations.   Gastrointestinal:  Negative for constipation, diarrhea, nausea and vomiting.   Genitourinary:  Negative for bladder incontinence, difficulty urinating and frequency.    Musculoskeletal:  Negative for arthralgias, gait problem and myalgias.   Skin:  Negative for itching, rash and wound.        PT GETS XOLAIR FOR HIVES   Neurological: Negative.  Negative for dizziness, gait problem, headaches, light-headedness and numbness.   Psychiatric/Behavioral:  Positive for depression. Negative for confusion, sleep disturbance and suicidal ideas. The patient is nervous/anxious.         RX MANAGED         Infusion Readiness:   - Assessment Concerns Related to Infusion: No  - Provider notified: n/a      Document Below Only If Indicated:   New Patient Education:    N/A (returning patient for continuation of therapy. Ongoing education provided as needed.)        Treatment Conditions & Drug Specific Questions:    Omalizumab  (XOLAIR)    (Unless otherwise specified on patient specific therapy plan):     DRUG SPECIFIC QUESTIONS:  Does the patient have a latex allergy? No  (Needle cap contains rubber (a derivative of latex); may cause allergic reactions for individuals sensitive to latex)      REMINDER:  May be weight based.     Recommended Vitals/Observation:  Vitals:      Induction: First 3 Injections: Take vital signs prior to injection and then 30, 60, 90 and 120 min post injection      Maintenance: Take vital signs prior to injection and then 30 minutes post injection.   Observation:       Induction: Observe patient for a minimum of 2 hours following administration of FIRST THREE dose     Maintenance: 30 minutes for the subsequent doses.        Weight Based Drug Calculations:    WEIGHT BASED DRUGS: NOT APPLICABLE / FLAT DOSE          Initiated By: Mary Chand RN   Time: 5:07 PM     We administered omalizumab.

## 2024-06-28 ENCOUNTER — APPOINTMENT (OUTPATIENT)
Dept: INFUSION THERAPY | Facility: CLINIC | Age: 44
End: 2024-06-28
Payer: COMMERCIAL

## 2024-06-28 DIAGNOSIS — L50.9 URTICARIA, UNSPECIFIED: ICD-10-CM

## 2024-07-02 ENCOUNTER — TELEMEDICINE (OUTPATIENT)
Dept: PRIMARY CARE | Facility: CLINIC | Age: 44
End: 2024-07-02
Payer: COMMERCIAL

## 2024-07-02 DIAGNOSIS — I10 HYPERTENSION, UNSPECIFIED TYPE: Primary | ICD-10-CM

## 2024-07-02 PROCEDURE — 99213 OFFICE O/P EST LOW 20 MIN: CPT | Performed by: STUDENT IN AN ORGANIZED HEALTH CARE EDUCATION/TRAINING PROGRAM

## 2024-07-02 PROCEDURE — 1036F TOBACCO NON-USER: CPT | Performed by: STUDENT IN AN ORGANIZED HEALTH CARE EDUCATION/TRAINING PROGRAM

## 2024-07-02 RX ORDER — HYDROCHLOROTHIAZIDE 25 MG/1
25 TABLET ORAL DAILY
Qty: 30 TABLET | Refills: 5 | Status: SHIPPED | OUTPATIENT
Start: 2024-07-02 | End: 2024-12-29

## 2024-07-02 NOTE — PATIENT INSTRUCTIONS
Please stop at the lab ([address]) to complete your blood and/or urine work that I've ordered for you.    I will contact you with the results at my soonest convenience. I strongly urge you to use [email or web address] as this is the quickest and easiest way to access your results and receive my correspondences.    Your blood pressure is not at target. Check your blood pressure daily; come back in 4-8 weeks with these numbers and for a repeat BP check.     I have added or changed your medications today. See the medication section of this packet for full details.      Further recommendations will be made based on test results and how you fare clinically.

## 2024-07-02 NOTE — PROGRESS NOTES
Subjective   Patient ID: Jody Richardson is a 43 y.o. female who presents for No chief complaint on file..    HPI   Re: HTN - did a health screening at her [employer]. Her BP at that time was 140/100, repeat testing 130s/90s. Home testing roughly the same. Reports occasional eye twitching. Reports no sx high or low from HTN; denies blurry vision, HA, dizziness LoC CP SoB Wilkinson and leg swelling     PMHx, FHx, Social Hx, Surg Hx personally reviewed at this appointment. No pertinent findings and/or changes from prior (if applicable).    ROS: Denies wt gain/loss f/c HA LoC CP SOB NVDC. See HPI above, and scanned sheet (if applicable). All other systems are reviewed and are without complaint.       Review of Systems    Objective   There were no vitals taken for this visit.    Physical Exam  Gen: Well appearing, AAO x 3.   HEENT: NC/AT. Symmetric pupils on webcam.   Pulm: no evidence of increased work of breathing on webcam  Skin: no blemishes or rashes on webcam     Assessment/Plan   # HTN: not at goal  - ambulatory pressures, RTC 4-8 weeks with BP log  - routine blood/urine work, if not recent  - lifestyle modifications discussed at length   - start hydrochlorothiazide (was on this in the remote past)

## 2024-07-03 ENCOUNTER — APPOINTMENT (OUTPATIENT)
Dept: INFUSION THERAPY | Facility: CLINIC | Age: 44
End: 2024-07-03
Payer: COMMERCIAL

## 2024-07-03 VITALS
RESPIRATION RATE: 18 BRPM | BODY MASS INDEX: 32.47 KG/M2 | TEMPERATURE: 97 F | WEIGHT: 171.85 LBS | DIASTOLIC BLOOD PRESSURE: 78 MMHG | OXYGEN SATURATION: 98 % | SYSTOLIC BLOOD PRESSURE: 115 MMHG | HEART RATE: 92 BPM

## 2024-07-03 DIAGNOSIS — L50.9 URTICARIA, UNSPECIFIED: ICD-10-CM

## 2024-07-03 RX ORDER — DIPHENHYDRAMINE HYDROCHLORIDE 50 MG/ML
50 INJECTION INTRAMUSCULAR; INTRAVENOUS AS NEEDED
OUTPATIENT
Start: 2024-07-24

## 2024-07-03 RX ORDER — FAMOTIDINE 10 MG/ML
20 INJECTION INTRAVENOUS ONCE AS NEEDED
OUTPATIENT
Start: 2024-07-24

## 2024-07-03 RX ORDER — ALBUTEROL SULFATE 0.83 MG/ML
3 SOLUTION RESPIRATORY (INHALATION) AS NEEDED
OUTPATIENT
Start: 2024-07-24

## 2024-07-03 RX ORDER — EPINEPHRINE 0.3 MG/.3ML
0.3 INJECTION SUBCUTANEOUS EVERY 5 MIN PRN
OUTPATIENT
Start: 2024-07-24

## 2024-07-03 ASSESSMENT — ENCOUNTER SYMPTOMS
WOUND: 0
SHORTNESS OF BREATH: 0
EXTREMITY WEAKNESS: 0
WHEEZING: 0
NUMBNESS: 0
PALPITATIONS: 0
LEG SWELLING: 0
DIZZINESS: 0
LIGHT-HEADEDNESS: 0
COUGH: 0

## 2024-07-03 NOTE — PROGRESS NOTES
[facility]   Infusion Clinic Note   Date: July 3, 2024   Name: Jody Richardson  : 1980   MRN: [id number]         Reason for Visit: OP Infusion and Injections (PT HERE FOR XOLAIR 300 MG INJECTION/NEXT APPT: 28 DAYS)         Today: We administered omalizumab.       Visit Type: INJECTION       Ordered By: DR SINHA      Accompanied by:Parent      Diagnosis: Urticaria, unspecified       Allergies:   Allergies as of 2024 - Reviewed 2024   Allergen Reaction Noted    Fentanyl Hives and Unknown 2023    Midazolam Hives and Itching 2023         Current Medications has a current medication list which includes the following prescription(s): albuterol, buspirone, famotidine, hydrochlorothiazide, hydroxyzine hcl, levocetirizine, lorazepam, olopatadine, xolair, prazosin, proair respiclick, sertraline, spironolacton-hydrochlorothiaz, and tretinoin.       Vitals:   Vitals:    24 1600   BP: 127/84   Pulse: 87   Resp: 16   Temp: 36.1 °C (96.9 °F)   SpO2: 98%   Weight: 78 kg (171 lb 13.6 oz)             Infusion Pre-procedure Checklist:   - Allergies reviewed: yes   - Medications reviewed: yes       - Previous reaction to current treatment: no      Assess patient for the concerns below. Document provider notification as appropriate.  - Active or recent infection with/without current antibiotic use: no  - Recent or planned invasive dental work: no  - Recent or planned surgeries: no  - Recently received or plans to receive vaccinations: no  - Has treatment related toxicities: no  - Is pregnant:  no      Pain: 0   - Is the pain different from normal: n/a   - Is the pain tolerable: n/a   - Is your Doctor aware:  n/a      Labs: N/A         Fall Risk Screening: Lorenzo Fall Risk  History of Falling, Immediate or Within 3 Months: No  Secondary Diagnosis: No  Ambulatory Aid: Walks without aid/bedrest/nurse assist  Intravenous Therapy/Heparin Lock: No  Gait/Transferring:  Normal/bedrest/immobile  Mental Status: Oriented to own ability  Ventura Fall Risk Score: 0       Review Of Systems:  Review of Systems   Respiratory:  Negative for cough, shortness of breath and wheezing.    Cardiovascular:  Negative for chest pain, leg swelling and palpitations.   Skin:  Negative for itching, rash and wound.   Neurological:  Negative for dizziness, extremity weakness, light-headedness and numbness.         Infusion Readiness:   - Assessment Concerns Related to Infusion: No  - Provider notified: n/a      Document Below Only If Indicated:   New Patient Education:    N/A (returning patient for continuation of therapy. Ongoing education provided as needed.)        Treatment Conditions & Drug Specific Questions:    Omalizumab  (XOLAIR)    (Unless otherwise specified on patient specific therapy plan):     DRUG SPECIFIC QUESTIONS:  Does the patient have a latex allergy? No  (Needle cap contains rubber (a derivative of latex); may cause allergic reactions for individuals sensitive to latex)      REMINDER:  May be weight based.     Recommended Vitals/Observation:  Vitals:      Induction: First 3 Injections: Take vital signs prior to injection and then 30, 60, 90 and 120 min post injection      Maintenance: Take vital signs prior to injection and then 30 minutes post injection.   Observation:      Induction: Observe patient for a minimum of 2 hours following administration of FIRST THREE dose     Maintenance: 30 minutes for the subsequent doses.        Weight Based Drug Calculations:    WEIGHT BASED DRUGS: NOT APPLICABLE / FLAT DOSE          Initiated By: Penny Bryan RN

## 2024-07-26 ENCOUNTER — APPOINTMENT (OUTPATIENT)
Dept: INFUSION THERAPY | Facility: CLINIC | Age: 44
End: 2024-07-26
Payer: COMMERCIAL

## 2024-08-01 ENCOUNTER — APPOINTMENT (OUTPATIENT)
Dept: INFUSION THERAPY | Facility: CLINIC | Age: 44
End: 2024-08-01
Payer: COMMERCIAL

## 2024-08-05 ENCOUNTER — APPOINTMENT (OUTPATIENT)
Dept: PRIMARY CARE | Facility: CLINIC | Age: 44
End: 2024-08-05
Payer: COMMERCIAL

## 2024-08-20 ENCOUNTER — APPOINTMENT (OUTPATIENT)
Dept: INFUSION THERAPY | Facility: CLINIC | Age: 44
End: 2024-08-20
Payer: COMMERCIAL

## 2024-08-20 VITALS
SYSTOLIC BLOOD PRESSURE: 110 MMHG | DIASTOLIC BLOOD PRESSURE: 77 MMHG | WEIGHT: 169.2 LBS | BODY MASS INDEX: 31.97 KG/M2 | RESPIRATION RATE: 16 BRPM | HEART RATE: 72 BPM | OXYGEN SATURATION: 97 % | TEMPERATURE: 97.4 F

## 2024-08-20 DIAGNOSIS — L50.9 URTICARIA, UNSPECIFIED: ICD-10-CM

## 2024-08-20 PROCEDURE — 96372 THER/PROPH/DIAG INJ SC/IM: CPT | Performed by: REGISTERED NURSE

## 2024-08-20 RX ORDER — FAMOTIDINE 10 MG/ML
20 INJECTION INTRAVENOUS ONCE AS NEEDED
OUTPATIENT
Start: 2024-08-28

## 2024-08-20 RX ORDER — DIPHENHYDRAMINE HYDROCHLORIDE 50 MG/ML
50 INJECTION INTRAMUSCULAR; INTRAVENOUS AS NEEDED
OUTPATIENT
Start: 2024-08-28

## 2024-08-20 RX ORDER — ALBUTEROL SULFATE 0.83 MG/ML
3 SOLUTION RESPIRATORY (INHALATION) AS NEEDED
OUTPATIENT
Start: 2024-08-28

## 2024-08-20 RX ORDER — EPINEPHRINE 0.3 MG/.3ML
0.3 INJECTION SUBCUTANEOUS EVERY 5 MIN PRN
OUTPATIENT
Start: 2024-08-28

## 2024-08-20 ASSESSMENT — ENCOUNTER SYMPTOMS
LEG SWELLING: 0
NUMBNESS: 0
PALPITATIONS: 0
SHORTNESS OF BREATH: 0
EXTREMITY WEAKNESS: 0
COUGH: 0
WOUND: 0
ROS SKIN COMMENTS: CHRONIC URTICARIA
DIZZINESS: 0
WHEEZING: 0
LIGHT-HEADEDNESS: 0

## 2024-08-20 NOTE — PROGRESS NOTES
[facility]   Infusion Clinic Note   Date: 2024   Name: Jody Richardson  : 1980   MRN: [id number]          Reason for Visit: Follow-up and Injections (PT HERE FOR XOLAIR 300 MG INJECTION EVERY MONTH)         Today: We administered omalizumab.       Visit Type: INJECTION       Ordered By: DR. BURGESS       Accompanied by:Self       Diagnosis: Urticaria, unspecified        Allergies:   Allergies as of 2024    (No Known Allergies)          Current Medications has a current medication list which includes the following prescription(s): albuterol, buspirone, famotidine, hydrochlorothiazide, hydroxyzine hcl, levocetirizine, lorazepam, xolair, prazosin, proair respiclick, sertraline, spironolacton-hydrochlorothiaz, tretinoin, and olopatadine.       Vitals:   Vitals:    24 1335   BP: 110/77   Pulse: 72   Resp: 16   Temp: 36.3 °C (97.4 °F)   SpO2: 97%   Weight: 76.8 kg (169 lb 3.3 oz)             Infusion Pre-procedure Checklist:   - Allergies reviewed: yes   - Medications reviewed: yes       - Previous reaction to current treatment: no      Assess patient for the concerns below. Document provider notification as appropriate.  - Active or recent infection with/without current antibiotic use: no  - Recent or planned invasive dental work: no  - Recent or planned surgeries: no  - Recently received or plans to receive vaccinations: no  - Has treatment related toxicities: no  - Is pregnant:  no      Pain: 0   - Is the pain different from normal: n/a   - Is the pain tolerable: n/a   - Is your Doctor aware:  n/a       Labs: N/A          Fall Risk Screening: Ventura Fall Risk  History of Falling, Immediate or Within 3 Months: Yes (FELL AT WORK,STANDING ON CHAIR AND FELLOFF)  Secondary Diagnosis: No  Ambulatory Aid: Walks without aid/bedrest/nurse assist  Intravenous Therapy/Heparin Lock: No  Gait/Transferring: Normal/bedrest/immobile  Mental Status: Oriented to own ability  Ventura Fall  Risk Score: 25       Review Of Systems:  Review of Systems   Respiratory:  Negative for cough, shortness of breath and wheezing.    Cardiovascular:  Negative for chest pain, leg swelling and palpitations.   Skin:  Positive for itching. Negative for rash and wound.        CHRONIC URTICARIA    Neurological:  Negative for dizziness, extremity weakness, light-headedness and numbness.         ROS completed? Yes      Infusion Readiness:  - Assessment Concerns Related to Infusion: No  - Provider notified: no      Document Below Only If Indicated:   New Patient Education:    N/A (returning patient for continuation of therapy. Ongoing education provided as needed.)        Treatment Conditions & Drug Specific Questions:    Omalizumab  (XOLAIR)    (Unless otherwise specified on patient specific therapy plan):     DRUG SPECIFIC QUESTIONS:  Does the patient have a latex allergy? No  (Needle cap contains rubber (a derivative of latex); may cause allergic reactions for individuals sensitive to latex)      REMINDER:  May be weight based.     Recommended Vitals/Observation:  Vitals:      Induction: First 3 Injections: Take vital signs prior to injection and then 30, 60, 90 and 120 min post injection      Maintenance: Take vital signs prior to injection and then 30 minutes post injection.   Observation:      Induction: Observe patient for a minimum of 2 hours following administration of FIRST THREE dose     Maintenance: 30 minutes for the subsequent doses.PT REFUSED OBS TODAY STATING SHE NEEDED TO RETURN TO WORK.         Weight Based Drug Calculations:    WEIGHT BASED DRUGS: NOT APPLICABLE / FLAT DOSE          Initiated By: Cecilia Varela RN      Patient's questions were answered by staff at the time of their infusion/injection visit. Patient was not independently evaluated by the Nurse Practitioner on site at the [facility].     08/20/24 at 2:25 PM - KIERA Reynsoo-SHARIFA

## 2024-08-23 ENCOUNTER — APPOINTMENT (OUTPATIENT)
Dept: INFUSION THERAPY | Facility: CLINIC | Age: 44
End: 2024-08-23
Payer: COMMERCIAL

## 2024-09-05 ENCOUNTER — APPOINTMENT (OUTPATIENT)
Dept: PRIMARY CARE | Facility: CLINIC | Age: 44
End: 2024-09-05
Payer: COMMERCIAL

## 2024-09-05 VITALS
DIASTOLIC BLOOD PRESSURE: 80 MMHG | WEIGHT: 169 LBS | HEART RATE: 80 BPM | SYSTOLIC BLOOD PRESSURE: 114 MMHG | TEMPERATURE: 96.8 F | BODY MASS INDEX: 31.93 KG/M2

## 2024-09-05 DIAGNOSIS — Z12.31 BREAST CANCER SCREENING BY MAMMOGRAM: ICD-10-CM

## 2024-09-05 DIAGNOSIS — Z00.00 HEALTHCARE MAINTENANCE: Primary | ICD-10-CM

## 2024-09-05 DIAGNOSIS — I10 HYPERTENSION, UNSPECIFIED TYPE: ICD-10-CM

## 2024-09-05 PROCEDURE — 90471 IMMUNIZATION ADMIN: CPT | Performed by: STUDENT IN AN ORGANIZED HEALTH CARE EDUCATION/TRAINING PROGRAM

## 2024-09-05 PROCEDURE — 90673 RIV3 VACCINE NO PRESERV IM: CPT | Performed by: STUDENT IN AN ORGANIZED HEALTH CARE EDUCATION/TRAINING PROGRAM

## 2024-09-05 PROCEDURE — 90472 IMMUNIZATION ADMIN EACH ADD: CPT | Performed by: STUDENT IN AN ORGANIZED HEALTH CARE EDUCATION/TRAINING PROGRAM

## 2024-09-05 PROCEDURE — 90715 TDAP VACCINE 7 YRS/> IM: CPT | Performed by: STUDENT IN AN ORGANIZED HEALTH CARE EDUCATION/TRAINING PROGRAM

## 2024-09-05 PROCEDURE — 99214 OFFICE O/P EST MOD 30 MIN: CPT | Performed by: STUDENT IN AN ORGANIZED HEALTH CARE EDUCATION/TRAINING PROGRAM

## 2024-09-05 PROCEDURE — 1036F TOBACCO NON-USER: CPT | Performed by: STUDENT IN AN ORGANIZED HEALTH CARE EDUCATION/TRAINING PROGRAM

## 2024-09-05 PROCEDURE — 3074F SYST BP LT 130 MM HG: CPT | Performed by: STUDENT IN AN ORGANIZED HEALTH CARE EDUCATION/TRAINING PROGRAM

## 2024-09-05 PROCEDURE — 3079F DIAST BP 80-89 MM HG: CPT | Performed by: STUDENT IN AN ORGANIZED HEALTH CARE EDUCATION/TRAINING PROGRAM

## 2024-09-05 RX ORDER — HYDROCHLOROTHIAZIDE 25 MG/1
25 TABLET ORAL DAILY
Qty: 90 TABLET | Refills: 3 | Status: SHIPPED | OUTPATIENT
Start: 2024-09-05 | End: 2025-09-05

## 2024-09-05 NOTE — PROGRESS NOTES
Subjective   Patient ID: Jennifer Richardson is a 44 y.o. female who presents for paperwork.    HPI   Re: work - requires forms for work. Will sign today. Needs MMR testing and TB testing as part of her job. Also needs flu and TDAP shots.     Re HTN - improving on the medication. Reports no sx high or low from HTN; denies blurry vision, HA, dizziness LoC CP SoB Wilkinson and leg swelling     Re: Seasonal allergies - Dr. Florian is her immunologist. Writes for Xolair for her allergies. Doing well on this medication.     Re:  MGUS - stable. See notes from Dr. Wiley    PMHx, FHx, Social Hx, Surg Hx personally reviewed at this appointment. No pertinent findings and/or changes from prior (if applicable).    ROS: Denies wt gain/loss f/c HA LoC CP SOB NVDC. See HPI above, and scanned sheet (if applicable). All other systems are reviewed and are without complaint.     Review of Systems    Objective   /80 (BP Location: Right arm, Patient Position: Sitting, BP Cuff Size: Adult)   Pulse 80   Temp 36 °C (96.8 °F) (Temporal)   Wt 76.7 kg (169 lb)   BMI 31.93 kg/m²     Physical Exam  Gen: well developed in NAD. AAO x3.  HEENT: NC/AT. Anicteric sclera, symmetric pupils. MMM no thrush.  Neck: Soft, supple. No LAD. No goiter.   CV: RRR nl s1s2 no m/r/g  Pulm: CTAB no w/r/r, good air exchange  GI: soft NTND BS+ no hsm  Ext: WWP no edema  Neuro: II-XII grossly intact, nonfocal systemic findings  MSK: 5/5 strength b/l UE and LE  Gait: unremarkable     Lab Results   Component Value Date    WBC 5.1 07/07/2023    HGB 12.9 07/07/2023    HCT 40.2 07/07/2023     07/07/2023    ALT 20 07/07/2023    AST 24 07/07/2023     07/07/2023    K 4.0 07/07/2023     07/07/2023    CREATININE 0.61 07/07/2023    BUN 10 07/07/2023    CO2 21 07/07/2023    TSH 2.69 04/29/2021    INR 1.1 01/23/2023     par    US PELVIS  MRN: [id number]  Patient Name: JENNIFER RICHARDSON     STUDY:  US PELVIS;  2/10/2023 3:59 pm     INDICATION:  History of MGUS and  endrometriosis, possible cyst on L overy noted on  PET scan  D47.2: MGUS (monoclonal gammopathy of unknown significance)  D56.9: Thalassemia N83.209: Ovarian cyst.     COMPARISON:  None.     ACCESSION NUMBER(S):  [id number]     ORDERING CLINICIAN:  EVELIA ALTMAN     TECHNIQUE:  Multiple multiplanar static gray scale, color and spectral waveform  sonographic images of the pelvis were obtained.  Transabdominal and  endovaginal ultrasound was performed.     FINDINGS:  UTERUS:  Surgically absent.     RIGHT ADNEXA:  Right ovary is not identified on today's examination.     LEFT ADNEXA:  Left ovary is not identified on today's examination.     CUL DE SAC:  No significant free fluid. No solid or cystic mass.     IMPRESSION:  1.  Status post hysterectomy. Ovaries not identified.          Assessment/Plan   # HTN: at/near goal  - continue current regimen  - routine lab work if not recent  - continue lifestyle modifications    # Work forms  - TB testing  - MMR testing  - TDAP and flu    # HM  - mamm ordered  - routine lab work

## 2024-09-05 NOTE — PATIENT INSTRUCTIONS
Please stop at any  lab ([address] if you choose to use my building) to complete your blood and/or urine work that I've ordered for you.    I will contact you with the results at my soonest convenience. I strongly urge you to use [email or web address] as this is the quickest and easiest way to access your results and receive my correspondences.     I have renewed your chronic medications today.     I have filled out your required forms and ordered the appropriate tests/blood work, if applicable.     I have ordered your mammogram today. Please call [phone] to schedule this at a convenient time and location. The nearest breast center to my office is at [facility].     You received your flu shot today!    You received your tetanus shot today!

## 2024-09-17 ENCOUNTER — APPOINTMENT (OUTPATIENT)
Dept: INFUSION THERAPY | Facility: CLINIC | Age: 44
End: 2024-09-17
Payer: COMMERCIAL

## 2024-09-26 ENCOUNTER — TELEPHONE (OUTPATIENT)
Dept: ALLERGY | Facility: CLINIC | Age: 44
End: 2024-09-26
Payer: COMMERCIAL

## 2024-09-26 NOTE — TELEPHONE ENCOUNTER
Called and left voicemail with call back instructions. Patient needs FUV for more refills for xolair.

## 2024-10-09 ENCOUNTER — APPOINTMENT (OUTPATIENT)
Dept: INFUSION THERAPY | Facility: CLINIC | Age: 44
End: 2024-10-09
Payer: COMMERCIAL

## 2024-10-09 VITALS
DIASTOLIC BLOOD PRESSURE: 77 MMHG | TEMPERATURE: 96.9 F | RESPIRATION RATE: 18 BRPM | HEART RATE: 76 BPM | WEIGHT: 168.54 LBS | OXYGEN SATURATION: 99 % | BODY MASS INDEX: 31.85 KG/M2 | SYSTOLIC BLOOD PRESSURE: 109 MMHG

## 2024-10-09 DIAGNOSIS — L50.9 URTICARIA, UNSPECIFIED: ICD-10-CM

## 2024-10-09 PROCEDURE — 96372 THER/PROPH/DIAG INJ SC/IM: CPT | Performed by: REGISTERED NURSE

## 2024-10-09 RX ORDER — EPINEPHRINE 0.3 MG/.3ML
0.3 INJECTION SUBCUTANEOUS EVERY 5 MIN PRN
OUTPATIENT
Start: 2024-10-15

## 2024-10-09 RX ORDER — ALBUTEROL SULFATE 0.83 MG/ML
3 SOLUTION RESPIRATORY (INHALATION) AS NEEDED
OUTPATIENT
Start: 2024-10-15

## 2024-10-09 RX ORDER — FAMOTIDINE 10 MG/ML
20 INJECTION INTRAVENOUS ONCE AS NEEDED
OUTPATIENT
Start: 2024-10-15

## 2024-10-09 RX ORDER — DIPHENHYDRAMINE HYDROCHLORIDE 50 MG/ML
50 INJECTION INTRAMUSCULAR; INTRAVENOUS AS NEEDED
OUTPATIENT
Start: 2024-10-15

## 2024-10-09 ASSESSMENT — ENCOUNTER SYMPTOMS
COUGH: 0
DIZZINESS: 0
LEG SWELLING: 0
NUMBNESS: 0
EXTREMITY WEAKNESS: 0
LIGHT-HEADEDNESS: 0
PALPITATIONS: 0
WHEEZING: 0
WOUND: 0
SHORTNESS OF BREATH: 0

## 2024-10-09 NOTE — PROGRESS NOTES
[facility]   Infusion Clinic Note   Date: 2024   Name: Jody Richardson  : 1980   MRN: [id number]         Reason for Visit: Injections and Follow-up (PT HERE FOR XOLAIR 300 MG INJECTION/NEXT APPT: 28 DAYS)         Today: We administered omalizumab.       Visit Type: INJECTION       Ordered By: DR SINHA      Accompanied by:SELF      Diagnosis: Urticaria, unspecified       Allergies:   Allergies as of 10/09/2024    (No Known Allergies)         Current Medications has a current medication list which includes the following prescription(s): albuterol, buspirone, famotidine, hydrochlorothiazide, hydroxyzine hcl, levocetirizine, lorazepam, olopatadine, xolair, prazosin, proair respiclick, sertraline, spironolacton-hydrochlorothiaz, and tretinoin.       Vitals:   Vitals:    10/09/24 1017 10/09/24 1050   BP: 104/75 109/77   Pulse: 75 76   Resp: 18 18   Temp: 36.1 °C (97 °F) 36.1 °C (96.9 °F)   TempSrc: Temporal    SpO2: 99% 99%   Weight: 76.5 kg (168 lb 8.7 oz)                Infusion Pre-procedure Checklist:   - Allergies reviewed: yes   - Medications reviewed: yes       - Previous reaction to current treatment: no      Assess patient for the concerns below. Document provider notification as appropriate.  - Active or recent infection with/without current antibiotic use: no  - Recent or planned invasive dental work: no  - Recent or planned surgeries: no  - Recently received or plans to receive vaccinations: no  - Has treatment related toxicities: no  - Is pregnant:  no      Pain: 0   - Is the pain different from normal: n/a   - Is the pain tolerable: n/a   - Is your Doctor aware:  n/a      Labs: N/A         Fall Risk Screening: Lorenzo Fall Risk  History of Falling, Immediate or Within 3 Months: Yes (PT FELL AT WORK STEPPING UP ON SOMETHING)  Secondary Diagnosis: No  Ambulatory Aid: Walks without aid/bedrest/nurse assist  Intravenous Therapy/Heparin Lock: No  Gait/Transferring:  Normal/bedrest/immobile  Mental Status: Oriented to own ability  [place] Fall Risk Score: 25       Review Of Systems:  Review of Systems   Respiratory:  Negative for cough, shortness of breath and wheezing.    Cardiovascular:  Negative for chest pain, leg swelling and palpitations.   Skin:  Negative for itching, rash and wound.   Neurological:  Negative for dizziness, extremity weakness, light-headedness and numbness.         Infusion Readiness:   - Assessment Concerns Related to Infusion: No  - Provider notified: n/a      Document Below Only If Indicated:   New Patient Education:    N/A (returning patient for continuation of therapy. Ongoing education provided as needed.)        Treatment Conditions & Drug Specific Questions:    Omalizumab  (XOLAIR)    (Unless otherwise specified on patient specific therapy plan):     DRUG SPECIFIC QUESTIONS:  Does the patient have a latex allergy? No  (Needle cap contains rubber (a derivative of latex); may cause allergic reactions for individuals sensitive to latex)      REMINDER:  May be weight based.     Recommended Vitals/Observation:  Vitals:      Induction: First 3 Injections: Take vital signs prior to injection and then 30, 60, 90 and 120 min post injection      Maintenance: Take vital signs prior to injection and then 30 minutes post injection.   Observation:      Induction: Observe patient for a minimum of 2 hours following administration of FIRST THREE dose     Maintenance: 30 minutes for the subsequent doses.        Weight Based Drug Calculations:    WEIGHT BASED DRUGS: NOT APPLICABLE / FLAT DOSE          Initiated By: Penny Bryan RN

## 2024-10-15 ENCOUNTER — APPOINTMENT (OUTPATIENT)
Dept: INFUSION THERAPY | Facility: CLINIC | Age: 44
End: 2024-10-15
Payer: COMMERCIAL

## 2024-11-05 ENCOUNTER — APPOINTMENT (OUTPATIENT)
Dept: INFUSION THERAPY | Facility: CLINIC | Age: 44
End: 2024-11-05
Payer: COMMERCIAL

## 2024-11-05 ENCOUNTER — TELEPHONE (OUTPATIENT)
Dept: ALLERGY | Facility: CLINIC | Age: 44
End: 2024-11-05

## 2024-11-11 ENCOUNTER — APPOINTMENT (OUTPATIENT)
Dept: ALLERGY | Facility: CLINIC | Age: 44
End: 2024-11-11
Payer: COMMERCIAL

## 2024-11-11 VITALS
SYSTOLIC BLOOD PRESSURE: 113 MMHG | TEMPERATURE: 97.7 F | WEIGHT: 170.6 LBS | HEART RATE: 90 BPM | BODY MASS INDEX: 32.21 KG/M2 | DIASTOLIC BLOOD PRESSURE: 81 MMHG | OXYGEN SATURATION: 97 % | HEIGHT: 61 IN

## 2024-11-11 DIAGNOSIS — D47.2 MONOCLONAL GAMMOPATHY OF UNDETERMINED SIGNIFICANCE: ICD-10-CM

## 2024-11-11 DIAGNOSIS — L50.8 CHRONIC URTICARIA: Primary | ICD-10-CM

## 2024-11-11 DIAGNOSIS — J45.20 MILD INTERMITTENT ASTHMA WITHOUT COMPLICATION (HHS-HCC): ICD-10-CM

## 2024-11-11 DIAGNOSIS — L50.9 URTICARIA, UNSPECIFIED: Primary | ICD-10-CM

## 2024-11-11 PROCEDURE — 99214 OFFICE O/P EST MOD 30 MIN: CPT | Performed by: ALLERGY & IMMUNOLOGY

## 2024-11-11 PROCEDURE — 1036F TOBACCO NON-USER: CPT | Performed by: ALLERGY & IMMUNOLOGY

## 2024-11-11 PROCEDURE — 3008F BODY MASS INDEX DOCD: CPT | Performed by: ALLERGY & IMMUNOLOGY

## 2024-11-11 RX ORDER — EPINEPHRINE 0.3 MG/.3ML
0.3 INJECTION SUBCUTANEOUS EVERY 5 MIN PRN
Status: CANCELLED | OUTPATIENT
Start: 2024-11-11

## 2024-11-11 RX ORDER — INHALER, ASSIST DEVICES
SPACER (EA) MISCELLANEOUS
Qty: 1 EACH | Refills: 0 | Status: SHIPPED | OUTPATIENT
Start: 2024-11-11

## 2024-11-11 RX ORDER — BUPROPION HYDROCHLORIDE 300 MG/1
TABLET ORAL
COMMUNITY
Start: 2020-09-18

## 2024-11-11 RX ORDER — ALBUTEROL SULFATE 0.83 MG/ML
3 SOLUTION RESPIRATORY (INHALATION) AS NEEDED
Status: CANCELLED | OUTPATIENT
Start: 2024-11-11

## 2024-11-11 RX ORDER — DIPHENHYDRAMINE HYDROCHLORIDE 50 MG/ML
50 INJECTION INTRAMUSCULAR; INTRAVENOUS AS NEEDED
Status: CANCELLED | OUTPATIENT
Start: 2024-11-11

## 2024-11-11 RX ORDER — FAMOTIDINE 10 MG/ML
20 INJECTION INTRAVENOUS ONCE AS NEEDED
Status: CANCELLED | OUTPATIENT
Start: 2024-11-11

## 2024-11-11 ASSESSMENT — ENCOUNTER SYMPTOMS
MUSCULOSKELETAL NEGATIVE: 1
HEMATOLOGIC/LYMPHATIC NEGATIVE: 1
GASTROINTESTINAL NEGATIVE: 1
CONSTITUTIONAL NEGATIVE: 1
EYES NEGATIVE: 1
ALLERGIC/IMMUNOLOGIC NEGATIVE: 1
RESPIRATORY NEGATIVE: 1
CARDIOVASCULAR NEGATIVE: 1

## 2024-11-11 ASSESSMENT — PAIN SCALES - GENERAL: PAINLEVEL_OUTOF10: 0-NO PAIN

## 2024-11-11 NOTE — PROGRESS NOTES
Advised by Dr. Cortes to renew Xolair therapy plan + order set following patient's follow up visit with her today. Sent per protocol. Patient interested in home injections in future if covered by insurance. Will work with  Specialty Pharmacy on benefits investigation.

## 2024-11-11 NOTE — PATIENT INSTRUCTIONS
It was nice to see you today    Continue Xolair 300 mg monthly.  We will check to see if you can do home infusions    You may use Albuterol 2 puffs every 4-6 hours as needed with spacer    Our nurse line phone number is [phone] if you have questions     We would like to see you in follow up in 12 months or sooner if needed

## 2024-11-11 NOTE — PROGRESS NOTES
"Jody Richardson presents for follow up evaluation today.  She is doing well since last visit.  She notes that hives are controlled as long as she is on Xolair.  She notices onset of hives as she gets closer to Xolair being due.  She takes Cetirizine as needed.  She continues to avoid NSAIDS.  She is following with Dr. Wiley in Hematology for MGUS at [facility].  She sees him once every 6 months (last in 1/2024).  She has not required therapy for MGUS.     Regarding asthma, she is using Albuterol approximately once monthly.  She does not have a spacer.     Review of Systems   Constitutional: Negative.    HENT: Negative.     Eyes: Negative.    Respiratory: Negative.     Cardiovascular: Negative.    Gastrointestinal: Negative.    Musculoskeletal: Negative.    Skin: Negative.    Allergic/Immunologic: Negative.    Hematological: Negative.        Vital signs:  /81   Pulse 90   Temp 36.5 °C (97.7 °F)   Ht 1.549 m (5' 1\")   Wt 77.4 kg (170 lb 9.6 oz)   SpO2 97%   BMI 32.23 kg/m²       GENERAL: Alert, oriented and in no acute distress.     HEENT: EYES: No conjunctival injection or cobblestoning. Nose: nasal turbinates are not edematous and are not boggy.  There is no mucous stranding, polyps, or blood    noted. EARS: Tympanic membranes are clear. MOUTH: moist and pink with no exudates, ulcers, or thrush. NECK: No upper airway stridor noted.       HEART: regular rate and rhythm.       LUNGS: Clear to auscultation bilaterally. No wheezing, rhonchi or rales.        ABDOMEN: Positive bowel sounds, soft, nontender, nondistended.       EXTREMITIES: No clubbing or edema.        NEURO:  Normal affect.  Gait normal.      SKIN: No rash, hives, or angioedema noted    Impression:   1. Chronic urticaria    2. Monoclonal gammopathy of undetermined significance    3. Mild intermittent asthma without complication ([facility])       Assessment and plan:  Chronic urticaria, dermatographism, history of MGUS: History of normal serum tryptase, " normal IgE, elevated CU index. Currently doing well on Xolair 300 mg SQ monthly and cetirizine 10 mg once daily as needed. Continue current management.  She would prefer home infusions if covered by insurance.       Asthma: Continue albuterol as needed.  Spacer teaching performed and spacer prescribed.      MGUS: Following with Dr. Wiley in Hematology at [facility] every 6 months. Recent labs obtained by hematology in January 2024. Last bone marrow biopsy and PET scan in 2021.     Plan for follow-up in 12 months or sooner if needed

## 2024-11-13 ENCOUNTER — TELEPHONE (OUTPATIENT)
Dept: ALLERGY | Facility: CLINIC | Age: 44
End: 2024-11-13
Payer: COMMERCIAL

## 2024-11-13 NOTE — TELEPHONE ENCOUNTER
Called and left voicemail with call back instructions. Patient can schedule December appointment with allergy for home going teaching. Patient has been receiving injections at [facility] due to insurance, but can now change to home administration. Appointment would be 30 minutes for xolair.

## 2024-11-14 ENCOUNTER — INFUSION (OUTPATIENT)
Dept: INFUSION THERAPY | Facility: CLINIC | Age: 44
End: 2024-11-14
Payer: COMMERCIAL

## 2024-11-14 VITALS
SYSTOLIC BLOOD PRESSURE: 115 MMHG | DIASTOLIC BLOOD PRESSURE: 82 MMHG | OXYGEN SATURATION: 96 % | TEMPERATURE: 97.5 F | HEART RATE: 82 BPM | RESPIRATION RATE: 18 BRPM

## 2024-11-14 DIAGNOSIS — L50.9 URTICARIA, UNSPECIFIED: ICD-10-CM

## 2024-11-14 PROCEDURE — 96372 THER/PROPH/DIAG INJ SC/IM: CPT | Performed by: NURSE PRACTITIONER

## 2024-11-14 RX ORDER — EPINEPHRINE 0.3 MG/.3ML
0.3 INJECTION SUBCUTANEOUS EVERY 5 MIN PRN
Status: CANCELLED | OUTPATIENT
Start: 2024-12-04

## 2024-11-14 RX ORDER — FAMOTIDINE 10 MG/ML
20 INJECTION INTRAVENOUS ONCE AS NEEDED
Status: CANCELLED | OUTPATIENT
Start: 2024-12-04

## 2024-11-14 RX ORDER — ALBUTEROL SULFATE 0.83 MG/ML
3 SOLUTION RESPIRATORY (INHALATION) AS NEEDED
Status: CANCELLED | OUTPATIENT
Start: 2024-12-04

## 2024-11-14 RX ORDER — DIPHENHYDRAMINE HYDROCHLORIDE 50 MG/ML
50 INJECTION INTRAMUSCULAR; INTRAVENOUS AS NEEDED
Status: CANCELLED | OUTPATIENT
Start: 2024-12-04

## 2024-11-14 NOTE — PATIENT INSTRUCTIONS
Today :We administered omalizumab. Xolair 300mg (total) subcutaneous injection   150mg right upper arm  150mg left upper arm    For:   1. Urticaria, unspecified         Your next appointment is due in:  28 days        Please read the  Medication Guide that was given to you and reviewed during todays visit.     (Tell all doctors including dentists that you are taking this medication)     Go to the emergency room or call 911 if:  -You have signs of allergic reaction:   -Rash, hives, itching.   -Swollen, blistered, peeling skin.   -Swelling of face, lips, mouth, tongue or throat.   -Tightness of chest, trouble breathing, swallowing or talking     Call your doctor:  - If injection sites gets red, warm, swollen, itchy or leaks fluid or pus.     (Leave band aids on your injection sites for at least 2 hours and keep area clean and dry  - If you get sick or have symptoms of infection or are not feeling well for any reason.    (Wash your hands often, stay away from people who are sick)  - If you have side effects from your medication that do not go away or are bothersome.     (Refer to the teaching your nurse gave you for side effects to call your doctor about)    - Common side effects may include:  stuffy nose, headache, feeling tired, muscle aches, upset stomach  - Before receiving any vaccines     - Call the [facility] at   If:  - You get sick, are on antibiotics, have had a recent vaccine, have surgery or dental work and your doctor wants your visit rescheduled.  - You need to cancel and reschedule your visit for any reason. Call at least 2 days before your visit if you need to cancel.   - Your insurance changes before your next visit.    (We will need to get approval from your new insurance. This can take up to two weeks.)     The [facility] is opened Monday thru Friday. We are closed on weekends and holidays.   Voice mail will take your call if the center is closed. If you leave a  message please allow 24 hours for a call back during weekdays. If you leave a message on a weekend/holiday, we will call you back the next business day.    A pharmacist is available Monday - Friday from 8:30AM to 3:30PM to help answer any questions you may have about your prescriptions(s). Please call pharmacy at:    [facility]: [phone]  [facility]: [phone]  [facility]: [phone]

## 2024-11-14 NOTE — PROGRESS NOTES
[facility]   Infusion Clinic Note   Date: 2024   Name: Jody Richardson  : 1980   MRN: [id number]          Reason for Visit: Follow-up and Injections (Every 28 days Xolair 300mg (total) subcutaneous injection/)         Today: We administered omalizumab.       Visit Type: INJECTION       Ordered By:Dr. Princess Sebastian MD       Accompanied by:Self       Diagnosis: Urticaria, unspecified        Allergies:   Allergies as of 2024    (No Known Allergies)          Current Medications has a current medication list which includes the following prescription(s): albuterol, bupropion xl, buspirone, famotidine, hydrochlorothiazide, hydroxyzine hcl, aerochamber mv, levocetirizine, lorazepam, olopatadine, omalizumab, prazosin, proair respiclick, sertraline, spironolacton-hydrochlorothiaz, and tretinoin.       Vitals:   Vitals:    24 1606 24 1643   BP: 110/75 115/82   Pulse: 82 82   Resp: 18 18   Temp: 36.8 °C (98.2 °F) 36.4 °C (97.5 °F)   TempSrc: Temporal Temporal   SpO2: 99% 96%             Infusion Pre-procedure Checklist:   - Allergies reviewed: yes   - Medications reviewed: yes       - Previous reaction to current treatment: no      Assess patient for the concerns below. Document provider notification as appropriate.  - Active or recent infection with/without current antibiotic use: no  - Recent or planned invasive dental work: no  - Recent or planned surgeries: no  - Recently received or plans to receive vaccinations: no  - Has treatment related toxicities: no  - Is pregnant:  no      Pain: 0   - Is the pain different from normal: n/a   - Is your Doctor aware:  n/a       Labs:           Fall Risk Screening: Lorenzo Fall Risk  History of Falling, Immediate or Within 3 Months: Yes (Patient fell getting into a van in 2024.  Legs gave out.  No injuries.)  Intravenous Therapy/Heparin Lock: No  Gait/Transferring: Normal/bedrest/immobile  Mental Status: Oriented to own  ability       Review Of Systems:  Review of Systems - Oncology      ROS completed? Yes      Infusion Readiness:  - Assessment Concerns Related to Infusion: No  - Provider notified: n/a      Document Below Only If Indicated:   New Patient Education:    N/A (returning patient for continuation of therapy. Ongoing education provided as needed.)        Treatment Conditions & Drug Specific Questions:    Omalizumab  (XOLAIR)    (Unless otherwise specified on patient specific therapy plan):     DRUG SPECIFIC QUESTIONS:  Does the patient have a latex allergy? No  (Needle cap contains rubber (a derivative of latex); may cause allergic reactions for individuals sensitive to latex)      REMINDER:  May be weight based.     Recommended Vitals/Observation:  Vitals:      Induction: First 3 Injections: Take vital signs prior to injection and then 30, 60, 90 and 120 min post injection      Maintenance: Take vital signs prior to injection and then 30 minutes post injection.   Observation:      Induction: Observe patient for a minimum of 2 hours following administration of FIRST THREE dose     Maintenance: 30 minutes for the subsequent doses.        Weight Based Drug Calculations:    WEIGHT BASED DRUGS: NOT APPLICABLE / FLAT DOSE          Initiated By: Marci Ramirez LPN

## 2024-11-15 DIAGNOSIS — L50.9 URTICARIA, UNSPECIFIED: ICD-10-CM

## 2024-11-27 ENCOUNTER — SPECIALTY PHARMACY (OUTPATIENT)
Dept: PHARMACY | Facility: CLINIC | Age: 44
End: 2024-11-27

## 2024-11-29 PROCEDURE — RXMED WILLOW AMBULATORY MEDICATION CHARGE

## 2024-12-03 ENCOUNTER — TELEPHONE (OUTPATIENT)
Dept: ALLERGY | Facility: HOSPITAL | Age: 44
End: 2024-12-03
Payer: COMMERCIAL

## 2024-12-03 NOTE — TELEPHONE ENCOUNTER
"----- Message from Jo Flores sent at 11/27/2024 11:04 AM EST -----  Regarding: RE:  Specialty Pharmacy Delivery  Good morning,    Lats update I am aware of for this was Renetta's telephone note from 11/13/2024: \"Called and left voicemail with call back instructions. Patient can schedule December appointment with allergy for home going teaching. Patient has been receiving injections at [facility] due to insurance, but can now change to home administration. Appointment would be 30 minutes for xolair.\"    Patient had last dose in [facility] on 11/14/24 (on every 4 week dosing) so is due around 12/12/2024.    Thanks!  Jo  ----- Message -----  From: Ioana Geronimo  Sent: 11/27/2024  10:55 AM EST  To: Jo Flores, PharmD; Timothy Mccray RN; #  Subject:  Specialty Pharmacy Delivery                   Hello,    This patient's next Xolair is supposed to be in office for injection training. Can you let me know when and where she gets scheduled so I can set the delivery? I called her this morning too, but just left a message.    Thank you,    Ioana Geronimo, CPStefani-Adv  Patient Support [facility], Asthma/Allergy/Cystic Fibrosis  [facility] [address], [place] [address]  Phone: [phone]  Fax: [phone]  Email: [email or web address]  "

## 2024-12-03 NOTE — TELEPHONE ENCOUNTER
Patient left  to schedule Xolair injection in office. Left return VM requesting callback to confirm [place] office location on 12/12/24.

## 2024-12-04 ENCOUNTER — PHARMACY VISIT (OUTPATIENT)
Dept: PHARMACY | Facility: CLINIC | Age: 44
End: 2024-12-04
Payer: MEDICAID

## 2024-12-09 ENCOUNTER — SPECIALTY PHARMACY (OUTPATIENT)
Dept: PHARMACY | Facility: CLINIC | Age: 44
End: 2024-12-09

## 2024-12-12 ENCOUNTER — APPOINTMENT (OUTPATIENT)
Dept: INFUSION THERAPY | Facility: CLINIC | Age: 44
End: 2024-12-12
Payer: COMMERCIAL

## 2024-12-19 ENCOUNTER — APPOINTMENT (OUTPATIENT)
Dept: ALLERGY | Facility: CLINIC | Age: 44
End: 2024-12-19
Payer: COMMERCIAL

## 2024-12-19 VITALS
RESPIRATION RATE: 18 BRPM | OXYGEN SATURATION: 98 % | TEMPERATURE: 98.3 F | HEART RATE: 76 BPM | DIASTOLIC BLOOD PRESSURE: 84 MMHG | SYSTOLIC BLOOD PRESSURE: 117 MMHG

## 2024-12-19 DIAGNOSIS — L50.8 CHRONIC URTICARIA: Primary | ICD-10-CM

## 2024-12-19 DIAGNOSIS — L50.8 CHRONIC URTICARIA: ICD-10-CM

## 2024-12-19 RX ORDER — EPINEPHRINE 0.3 MG/.3ML
0.3 INJECTION SUBCUTANEOUS AS NEEDED
Qty: 2 EACH | Refills: 1 | Status: SHIPPED | OUTPATIENT
Start: 2024-12-19 | End: 2025-12-19

## 2024-12-19 NOTE — PROGRESS NOTES
Zacheryadan here today for her regularly scheduled biologic injection, per protocol. Patient in good state of health. Patient taught to inject with training pen by RN. Patient then practiced injection with  under RN supervision with no verbal instruction. Patient had good technique and patient states they are comfortable with self injection of the medication. Patient educated on storage of medication, frequency of administration and safety of using medication once out of the fridge. RN injected first syringe for demonstration, the Patient injected second syringe. Jody received her shot as planned, as recorded in flowsheet for this encounter, waited for 30 minutes after her injection and left the office after that still at their baseline state of health. Will continue Xolair as planned and call us in case any symptoms or reaction from today's injection are noted.

## 2024-12-23 ENCOUNTER — HOSPITAL ENCOUNTER (EMERGENCY)
Facility: HOSPITAL | Age: 44
Discharge: HOME | End: 2024-12-23
Payer: COMMERCIAL

## 2024-12-23 VITALS
HEIGHT: 61 IN | TEMPERATURE: 98.5 F | BODY MASS INDEX: 33.04 KG/M2 | WEIGHT: 175 LBS | HEART RATE: 94 BPM | OXYGEN SATURATION: 98 % | SYSTOLIC BLOOD PRESSURE: 109 MMHG | DIASTOLIC BLOOD PRESSURE: 78 MMHG | RESPIRATION RATE: 16 BRPM

## 2024-12-23 DIAGNOSIS — U07.1 COVID: Primary | ICD-10-CM

## 2024-12-23 LAB
FLUAV RNA RESP QL NAA+PROBE: NOT DETECTED
FLUBV RNA RESP QL NAA+PROBE: NOT DETECTED
SARS-COV-2 RNA RESP QL NAA+PROBE: DETECTED

## 2024-12-23 PROCEDURE — 2500000001 HC RX 250 WO HCPCS SELF ADMINISTERED DRUGS (ALT 637 FOR MEDICARE OP): Performed by: NURSE PRACTITIONER

## 2024-12-23 PROCEDURE — 87636 SARSCOV2 & INF A&B AMP PRB: CPT | Performed by: NURSE PRACTITIONER

## 2024-12-23 PROCEDURE — 99283 EMERGENCY DEPT VISIT LOW MDM: CPT

## 2024-12-23 RX ORDER — IBUPROFEN 600 MG/1
600 TABLET ORAL ONCE
Status: COMPLETED | OUTPATIENT
Start: 2024-12-23 | End: 2024-12-23

## 2024-12-23 RX ORDER — IBUPROFEN 600 MG/1
600 TABLET ORAL 2 TIMES DAILY
Qty: 20 TABLET | Refills: 0 | Status: SHIPPED | OUTPATIENT
Start: 2024-12-23 | End: 2025-01-02

## 2024-12-23 RX ORDER — ACETAMINOPHEN 500 MG
1000 TABLET ORAL 2 TIMES DAILY
Qty: 60 TABLET | Refills: 0 | Status: SHIPPED | OUTPATIENT
Start: 2024-12-23 | End: 2025-01-07

## 2024-12-23 ASSESSMENT — PAIN SCALES - GENERAL: PAINLEVEL_OUTOF10: 6

## 2024-12-23 ASSESSMENT — PAIN - FUNCTIONAL ASSESSMENT: PAIN_FUNCTIONAL_ASSESSMENT: 0-10

## 2024-12-23 NOTE — Clinical Note
Jody Richardson was seen and treated in our emergency department on 12/23/2024.  She may return to work on 12/30/2024.       If you have any questions or concerns, please don't hesitate to call.      Archie Castañeda, KIERA-CNP

## 2024-12-23 NOTE — ED PROVIDER NOTES
HPI   Chief Complaint   Patient presents with    Fever    Cough    Headache       44-year-old female presents today with exposure to COVID from family member.  She endorses headache, cough, fever and chills.  She had the first 2 shots for COVID and she is flu vaccinated.  She had a hysterectomy.  She denies dysuria or hematuria.  She denies diarrhea or constipation.  She rates her headache 6 out of 10.  She took a Tylenol last night.  She did not take Motrin.  She has no other cause for concern or complaint.  She has no allergies to medication.  She denies skin rash, abdominal pain, nausea, or vomiting.      History provided by:  Patient   used: No            Patient History   Past Medical History:   Diagnosis Date    Myopia, bilateral     Myopia of both eyes    Ocular pain, unspecified eye 11/03/2014    Eye pain    Unspecified asthma with (acute) exacerbation ([facility]) 03/16/2015    Acute asthma exacerbation    Unspecified injury of right eye and orbit, initial encounter 11/03/2014    Trauma to eye, right     Past Surgical History:   Procedure Laterality Date    CT GUIDED PERCUTANEOUS BIOPSY BONE DEEP  12/11/2020    CT GUIDED PERCUTANEOUS BIOPSY BONE DEEP 12/11/2020 [facility]    CT GUIDED PERCUTANEOUS BIOPSY BONE DEEP  7/21/2021    CT GUIDED PERCUTANEOUS BIOPSY BONE DEEP 7/21/2021 [facility]    HYSTERECTOMY  09/26/2018    Hysterectomy    MOUTH SURGERY  02/20/2018    Oral Surgery Tooth Extraction     Family History   Problem Relation Name Age of Onset    Hypertension Mother      Alcohol abuse Father      Other (Alcoholism) Father      Bipolar disorder Cousin      Schizophrenia Cousin       Social History     Tobacco Use    Smoking status: Never    Smokeless tobacco: Never   Substance Use Topics    Alcohol use: Never    Drug use: Never       Physical Exam   ED Triage Vitals [12/23/24 1007]   Temperature Heart Rate Respirations BP   36.9 °C (98.5 °F) 94 16 109/78      Pulse Ox Temp src  Heart Rate Source Patient Position   98 % -- -- --      BP Location FiO2 (%)     -- --       Physical Exam  Constitutional:       Appearance: She is well-developed.   HENT:      Head: Normocephalic and atraumatic.   Eyes:      General: No visual field deficit.     Extraocular Movements: Extraocular movements intact.      Pupils: Pupils are equal, round, and reactive to light.   Cardiovascular:      Rate and Rhythm: Normal rate and regular rhythm.      Heart sounds: Normal heart sounds.   Pulmonary:      Effort: Pulmonary effort is normal.      Breath sounds: Normal breath sounds.   Abdominal:      General: Bowel sounds are normal.      Palpations: Abdomen is soft.   Musculoskeletal:         General: Normal range of motion.      Cervical back: Normal range of motion and neck supple.   Skin:     General: Skin is warm and dry.   Neurological:      Mental Status: She is alert.      GCS: GCS eye subscore is 4. GCS verbal subscore is 5. GCS motor subscore is 6.      Cranial Nerves: No cranial nerve deficit, dysarthria or facial asymmetry.      Sensory: No sensory deficit.      Motor: No weakness.      Coordination: Romberg sign negative. Coordination normal.      Gait: Gait normal.   Psychiatric:         Mood and Affect: Mood normal.         Behavior: Behavior normal.           ED Course & MDM   Diagnoses as of 12/23/24 1134   COVID                 No data recorded                                 Medical Decision Making  Physical exam appeared normal.  Patient did not appear to be in acute distress.  I gave her pretzels and fishies to eat.  She received Motrin for headache.  She was swabbed for COVID and flu.  Patient was positive for COVID.  She did not appear to be in acute distress.  She already had 2 COVID vaccinations so at this time I did not feel that we need to start her on Paxil bid and Paxil it has been causing some COVID rebound.  She can use Motrin and Tylenol interchangeably.  We gave her Motrin before  discharge.  I gave her requested work note and she was safely discharged home.        Procedure  Procedures     Archie Castañeda, APRN-CNP  12/23/24 1138

## 2024-12-26 ENCOUNTER — SPECIALTY PHARMACY (OUTPATIENT)
Dept: PHARMACY | Facility: CLINIC | Age: 44
End: 2024-12-26

## 2024-12-26 PROCEDURE — RXMED WILLOW AMBULATORY MEDICATION CHARGE

## 2024-12-30 ENCOUNTER — PHARMACY VISIT (OUTPATIENT)
Dept: PHARMACY | Facility: CLINIC | Age: 44
End: 2024-12-30
Payer: MEDICAID

## 2025-01-21 ENCOUNTER — SPECIALTY PHARMACY (OUTPATIENT)
Dept: PHARMACY | Facility: CLINIC | Age: 45
End: 2025-01-21

## 2025-01-21 PROCEDURE — RXMED WILLOW AMBULATORY MEDICATION CHARGE

## 2025-01-24 ENCOUNTER — PHARMACY VISIT (OUTPATIENT)
Dept: PHARMACY | Facility: CLINIC | Age: 45
End: 2025-01-24
Payer: MEDICAID

## 2025-02-17 ENCOUNTER — SPECIALTY PHARMACY (OUTPATIENT)
Dept: PHARMACY | Facility: CLINIC | Age: 45
End: 2025-02-17

## 2025-02-17 PROCEDURE — RXMED WILLOW AMBULATORY MEDICATION CHARGE

## 2025-02-25 ENCOUNTER — PHARMACY VISIT (OUTPATIENT)
Dept: PHARMACY | Facility: CLINIC | Age: 45
End: 2025-02-25
Payer: MEDICAID

## 2025-03-17 ENCOUNTER — SPECIALTY PHARMACY (OUTPATIENT)
Dept: PHARMACY | Facility: CLINIC | Age: 45
End: 2025-03-17

## 2025-03-17 PROCEDURE — RXMED WILLOW AMBULATORY MEDICATION CHARGE

## 2025-03-20 ENCOUNTER — SPECIALTY PHARMACY (OUTPATIENT)
Dept: PHARMACY | Facility: CLINIC | Age: 45
End: 2025-03-20

## 2025-03-28 ENCOUNTER — PATIENT MESSAGE (OUTPATIENT)
Dept: ALLERGY | Facility: CLINIC | Age: 45
End: 2025-03-28
Payer: COMMERCIAL

## 2025-03-29 ENCOUNTER — PHARMACY VISIT (OUTPATIENT)
Dept: PHARMACY | Facility: CLINIC | Age: 45
End: 2025-03-29
Payer: MEDICAID

## 2025-04-23 ENCOUNTER — SPECIALTY PHARMACY (OUTPATIENT)
Dept: PHARMACY | Facility: CLINIC | Age: 45
End: 2025-04-23

## 2025-04-23 PROCEDURE — RXMED WILLOW AMBULATORY MEDICATION CHARGE

## 2025-04-29 ENCOUNTER — PHARMACY VISIT (OUTPATIENT)
Dept: PHARMACY | Facility: CLINIC | Age: 45
End: 2025-04-29
Payer: MEDICAID

## 2025-05-02 ENCOUNTER — HOSPITAL ENCOUNTER (EMERGENCY)
Facility: HOSPITAL | Age: 45
Discharge: HOME | End: 2025-05-02
Payer: COMMERCIAL

## 2025-05-02 VITALS
DIASTOLIC BLOOD PRESSURE: 86 MMHG | TEMPERATURE: 99 F | OXYGEN SATURATION: 98 % | RESPIRATION RATE: 16 BRPM | SYSTOLIC BLOOD PRESSURE: 123 MMHG | HEART RATE: 74 BPM

## 2025-05-02 DIAGNOSIS — R50.9 FEVER, UNSPECIFIED FEVER CAUSE: ICD-10-CM

## 2025-05-02 DIAGNOSIS — M79.10 MYALGIA: Primary | ICD-10-CM

## 2025-05-02 LAB
FLUAV RNA RESP QL NAA+PROBE: NOT DETECTED
FLUBV RNA RESP QL NAA+PROBE: NOT DETECTED
RSV RNA RESP QL NAA+PROBE: NOT DETECTED
SARS-COV-2 RNA RESP QL NAA+PROBE: NOT DETECTED

## 2025-05-02 PROCEDURE — 87637 SARSCOV2&INF A&B&RSV AMP PRB: CPT

## 2025-05-02 PROCEDURE — 99283 EMERGENCY DEPT VISIT LOW MDM: CPT

## 2025-05-02 PROCEDURE — 2500000001 HC RX 250 WO HCPCS SELF ADMINISTERED DRUGS (ALT 637 FOR MEDICARE OP)

## 2025-05-02 RX ORDER — ACETAMINOPHEN 325 MG/1
650 TABLET ORAL ONCE
Status: COMPLETED | OUTPATIENT
Start: 2025-05-02 | End: 2025-05-02

## 2025-05-02 RX ORDER — IBUPROFEN 600 MG/1
600 TABLET ORAL ONCE
Status: COMPLETED | OUTPATIENT
Start: 2025-05-02 | End: 2025-05-02

## 2025-05-02 RX ORDER — ACETAMINOPHEN 500 MG
1000 TABLET ORAL EVERY 6 HOURS PRN
Qty: 30 TABLET | Refills: 0 | Status: SHIPPED | OUTPATIENT
Start: 2025-05-02 | End: 2025-05-12

## 2025-05-02 RX ORDER — IBUPROFEN 600 MG/1
600 TABLET ORAL EVERY 6 HOURS PRN
Qty: 28 TABLET | Refills: 0 | Status: SHIPPED | OUTPATIENT
Start: 2025-05-02 | End: 2025-05-09

## 2025-05-02 RX ADMIN — ACETAMINOPHEN 650 MG: 325 TABLET ORAL at 09:37

## 2025-05-02 RX ADMIN — IBUPROFEN 600 MG: 600 TABLET ORAL at 09:37

## 2025-05-02 ASSESSMENT — PAIN SCALES - GENERAL: PAINLEVEL_OUTOF10: 0 - NO PAIN

## 2025-05-02 ASSESSMENT — COLUMBIA-SUICIDE SEVERITY RATING SCALE - C-SSRS
2. HAVE YOU ACTUALLY HAD ANY THOUGHTS OF KILLING YOURSELF?: NO
1. IN THE PAST MONTH, HAVE YOU WISHED YOU WERE DEAD OR WISHED YOU COULD GO TO SLEEP AND NOT WAKE UP?: NO
6. HAVE YOU EVER DONE ANYTHING, STARTED TO DO ANYTHING, OR PREPARED TO DO ANYTHING TO END YOUR LIFE?: NO

## 2025-05-02 ASSESSMENT — PAIN - FUNCTIONAL ASSESSMENT: PAIN_FUNCTIONAL_ASSESSMENT: 0-10

## 2025-05-02 NOTE — ED TRIAGE NOTES
Patient to ED for c/o Flu symptoms. Patient reports having a temp of 100.5F that started last night. Patient also reporting body aches. Patient denies cough and SOB.

## 2025-05-02 NOTE — ED PROVIDER NOTES
HPI   CC: Flu Symptoms     Patient is a 44-year-old female with PMH chronic urticaria, asthma, MGUS presented to the ED with concern for fever and bodyaches.  Patient states yesterday she woke up she felt febrile and she saw her temperature was 100.8 F.  She additionally endorses body aches.  She has been taking Tylenol at home and states that will bring the fever down to 100.4.  Denies any headaches, otalgia, otorrhea, eye redness/discharge, congestion, rhinorrhea, sore throat, nausea, vomiting, diarrhea, abdominal pain, chest pain, cough, shortness of breath, rash.  She has had a flu shot this year.  She has had a COVID shot in the past but not this year.  Denies any known sick exposures.        ROS: 10-point review of systems was performed and is otherwise negative except as noted in HPI.    Limitations to history: N/A  Independent Historians: Self  External Records Reviewed: Outpatient notes in EMR    Past Medical History: Noncontributory except per HPI     Past Surgical History: Noncontributory except per HPI     Family History: Reviewed and noncontributory     Social History:  Denies tobacco. Denies ETOH. Denies illicit drugs.    RX Allergies[1]    Home Meds:   Current Outpatient Medications   Medication Instructions    acetaminophen (TYLENOL) 1,000 mg, oral, Every 6 hours PRN    albuterol (Ventolin HFA) 90 mcg/actuation inhaler 2 puffs, inhalation, Every 4 hours PRN    buPROPion XL (Wellbutrin XL) 300 mg 24 hr tablet Daily RT    busPIRone (BUSPAR) 5 mg, 4 times daily    EPINEPHrine (EPIPEN) 0.3 mg, intramuscular, As needed, Call 911 after use.    famotidine (Pepcid) 20 mg tablet 1 tablet, Every 12 hours    hydroCHLOROthiazide (HYDRODIURIL) 25 mg, oral, Daily    hydrOXYzine HCL (ATARAX) 25 mg, 3 times daily PRN    ibuprofen 600 mg, oral, Every 6 hours PRN    inhalational spacing device (Aerochamber MV) inhaler Use as instructed    levocetirizine (Xyzal) 5 mg tablet 2 tablets, 2 times daily    LORazepam  (Ativan) 0.5 mg tablet take one tablet by mouth once a day as needed    olopatadine (Patanol) 0.1 % ophthalmic solution 1 drop, Both Eyes, 2 times daily PRN    prazosin (Minipress) 5 mg capsule 2 capsules, Nightly    ProAir RespiClick 90 mcg/actuation aerosol powdr breath activated inhaler inhalation    sertraline (ZOLOFT) 100 mg, 2 times daily    spironolacton-hydrochlorothiaz (Aldactazide) 25-25 mg tablet 1 tablet    tretinoin (Retin-A) 0.025 % cream 1 Application    Xolair 300 mg, subcutaneous, Every 28 days        Physical Exam     ED Triage Vitals [05/02/25 0916]   Temperature Heart Rate Respirations BP   (!) 38 °C (100.4 °F) 94 18 123/86      Pulse Ox Temp src Heart Rate Source Patient Position   96 % -- -- --      BP Location FiO2 (%)     -- --         Vitals and nursing note reviewed.   Physical Exam  Constitutional:       General: She is not in acute distress.     Appearance: Normal appearance. She is not ill-appearing, toxic-appearing or diaphoretic.   HENT:      Head: Normocephalic and atraumatic.      Comments: Frontal and maxillary sinuses nontender     Right Ear: Tympanic membrane, ear canal and external ear normal. There is no impacted cerumen.      Left Ear: Tympanic membrane, ear canal and external ear normal. There is no impacted cerumen.      Nose: Nose normal. No congestion or rhinorrhea.      Mouth/Throat:      Mouth: Mucous membranes are moist.      Pharynx: Oropharynx is clear. No oropharyngeal exudate or posterior oropharyngeal erythema.   Eyes:      General: No scleral icterus.        Right eye: No discharge.         Left eye: No discharge.      Extraocular Movements: Extraocular movements intact.      Conjunctiva/sclera: Conjunctivae normal.      Pupils: Pupils are equal, round, and reactive to light.   Cardiovascular:      Rate and Rhythm: Normal rate and regular rhythm.      Heart sounds: Normal heart sounds. No murmur heard.     No friction rub. No gallop.   Pulmonary:      Effort:  Pulmonary effort is normal. No respiratory distress.      Breath sounds: Normal breath sounds. No stridor. No wheezing, rhonchi or rales.   Abdominal:      General: Abdomen is flat. Bowel sounds are normal. There is no distension.      Palpations: Abdomen is soft.      Tenderness: There is no abdominal tenderness. There is no right CVA tenderness, left CVA tenderness, guarding or rebound.   Musculoskeletal:         General: Normal range of motion.      Cervical back: Normal range of motion and neck supple. No rigidity or tenderness.   Lymphadenopathy:      Cervical: No cervical adenopathy.   Skin:     General: Skin is warm and dry.      Capillary Refill: Capillary refill takes less than 2 seconds.   Neurological:      General: No focal deficit present.      Mental Status: She is alert and oriented to person, place, and time.   Psychiatric:         Mood and Affect: Mood normal.         Behavior: Behavior normal.         Diagnostic Results      Labs Reviewed   SARS-COV-2, INFLUENZA A/B AND RSV PCR - Normal       Result Value    Coronavirus 2019, PCR Not Detected      Flu A Result Not Detected      Flu B Result Not Detected      RSV PCR Not Detected      Narrative:     This assay is an FDA-cleared, in vitro diagnostic nucleic acid amplification test for the qualitative detection and differentiation of SARS CoV-2/ Influenza A/B/ RSV from nasopharyngeal specimens collected from individuals with signs and symptoms of respiratory tract infections, and has been validated for use at [facility]. Negative results do not preclude COVID-19/ Influenza A/B/ RSV infections and should not be used as the sole basis for diagnosis, treatment, or other management decisions. Testing for SARS CoV-2 is recommended only for patients who meet current clinical and/or epidemiological criteria defined by federal, state, or local public health directives.         No orders to display       ED Course & MDM    Assessment/Plan:   Medications   acetaminophen (Tylenol) tablet 650 mg (650 mg oral Given 5/2/25 0937)   ibuprofen tablet 600 mg (600 mg oral Given 5/2/25 0937)      ED Course as of 05/02/25 1030   Fri May 02, 2025   0932 Patient is a 44-year-old female presenting to ED with concern for fever and bodyaches.  She is febrile at 100.4 F, vital signs otherwise stable and patient is nontoxic-appearing.  Lungs are clear to auscultation bilaterally, making pneumonia less likely.  Sinuses are nontender bilaterally, lower suspicion for sinusitis.  There is no signs of meningismus on exam,lower suspicion for meningitis.  I suspect symptoms most likely from a viral syndrome.  Will give Tylenol and ibuprofen in ED.  Will swab for COVID, flu, RSV.  Considered ordering chest x-ray, however patient has equal breath sounds bilaterally with good air exchange and no adventitious sounds. [VS]   1029 Patient was given Tylenol and ibuprofen and on recheck she states she feels better.  Fever came down to 99F.  Patient was given 2 ginger ale's and she passed p.o. challenge.  Heart rate came down to 74.  Swabs negative for COVID, flu, RSV.  I suspect most likely a viral illness.  I discussed case with Brian Doan PA-C.  Offered prescription for Tylenol and ibuprofen which patient requested.  Prescription sent.  Patient states she does have PCP she can follow-up with.  Recommend follow-up in 1 to 2 days for repeat evaluation.  Patient told to return to ED for any new or worsening symptoms as outlined in discharge instructions. [VS]      ED Course User Index  [VS] Fernandez Santos PA-C         Diagnoses as of 05/02/25 1030   Myalgia   Fever, unspecified fever cause       ED Prescriptions       Medication Sig Dispense Start Date End Date Auth. Provider    acetaminophen (Tylenol) 500 mg tablet Take 2 tablets (1,000 mg) by mouth every 6 hours if needed for mild pain (1 - 3) for up to 10 days. 30 tablet 5/2/2025 5/12/2025 Fernandez Klein  NARESH Santos PA-C    ibuprofen 600 mg tablet Take 1 tablet (600 mg) by mouth every 6 hours if needed for mild pain (1 - 3) for up to 7 days. 28 tablet 5/2/2025 5/9/2025 Fernandez Santos PA-C            Chronic Medical Conditions Significantly Affecting Care:      Escalation of Care: Appropriate for outpatient management     Discussion of Management with Other Providers:  I discussed the patient/results with: Brian Doan PA-C    Counseling: Spoke with the patient and discussed today´s findings, in addition to providing specific details for the plan of care and expected course.  Patient was given the opportunity to ask questions.    Discussed return precautions and importance of follow-up.  Advised to follow-up with PCP.  Advised to return to the ED for changing or worsening symptoms, new symptoms, complaint specific precautions, and precautions listed on the discharge paperwork.  Educated on the common potential side effects of medications prescribed.    I advised the patient that the emergency evaluation and treatment provided today doesn't end their need for medical care. It is very important that they follow-up with their primary care provider or other specialist as instructed.    The plan of care was mutually agreed upon with the patient. The patient and/or family were given the opportunity to ask questions. All questions asked today in the ED were answered to the best of my ability with today's information.    I specifically advised the patient to return to the ED for changing or worsening symptoms, worrisome new symptoms, or for any complaint specific precautions listed on the discharge paperwork.    Procedure  Procedures         [1] No Known Allergies       Fernandez Santos PA-C  05/02/25 9253

## 2025-05-02 NOTE — DISCHARGE INSTRUCTIONS
Can alternate taking tylenol and motrin as needed for fever and body aches  Please increase your fluid intake  Follow up with your primary doctor within the next couple of days for repeat evaluation to ensure symptoms are improving  Return to ED for any new or worsening symptoms including but not limited to spiking fevers, uncontrollable vomiting, trouble breathing, chest pain, coughing up blood, or any other concern

## 2025-05-19 DIAGNOSIS — F41.9 ANXIETY: Primary | ICD-10-CM

## 2025-05-19 RX ORDER — SERTRALINE HYDROCHLORIDE 100 MG/1
100 TABLET, FILM COATED ORAL 2 TIMES DAILY
Qty: 180 TABLET | Refills: 3 | Status: SHIPPED | OUTPATIENT
Start: 2025-05-19

## 2025-05-21 ENCOUNTER — SPECIALTY PHARMACY (OUTPATIENT)
Dept: PHARMACY | Facility: CLINIC | Age: 45
End: 2025-05-21

## 2025-05-21 PROCEDURE — RXMED WILLOW AMBULATORY MEDICATION CHARGE

## 2025-05-27 ENCOUNTER — SPECIALTY PHARMACY (OUTPATIENT)
Dept: PHARMACY | Facility: CLINIC | Age: 45
End: 2025-05-27

## 2025-05-27 NOTE — PROGRESS NOTES
Specialty Pharmacist contacted patient regarding the Xolair  formulation change. Educated patient on the following changes: different product packaging, smaller needle size for a more comfortable injection, and a latex free cap for lower allergy risk. The patient can expect to receive either the old Xolair product or newer product during a transition period over the next few months. The new product is equivalent to the existing product. Patient was encouraged to reach out the  Specialty Pharmacy at [phone] with any future questions or concerns.

## 2025-05-29 ENCOUNTER — PHARMACY VISIT (OUTPATIENT)
Dept: PHARMACY | Facility: CLINIC | Age: 45
End: 2025-05-29
Payer: MEDICAID

## 2025-06-20 ENCOUNTER — SPECIALTY PHARMACY (OUTPATIENT)
Dept: PHARMACY | Facility: CLINIC | Age: 45
End: 2025-06-20

## 2025-06-20 PROCEDURE — RXMED WILLOW AMBULATORY MEDICATION CHARGE

## 2025-06-25 ENCOUNTER — PHARMACY VISIT (OUTPATIENT)
Dept: PHARMACY | Facility: CLINIC | Age: 45
End: 2025-06-25
Payer: MEDICAID

## 2025-07-11 ENCOUNTER — APPOINTMENT (OUTPATIENT)
Dept: RADIOLOGY | Facility: CLINIC | Age: 45
End: 2025-07-11
Payer: COMMERCIAL

## 2025-07-18 ENCOUNTER — SPECIALTY PHARMACY (OUTPATIENT)
Dept: PHARMACY | Facility: CLINIC | Age: 45
End: 2025-07-18

## 2025-07-18 PROCEDURE — RXMED WILLOW AMBULATORY MEDICATION CHARGE

## 2025-07-26 ENCOUNTER — PHARMACY VISIT (OUTPATIENT)
Dept: PHARMACY | Facility: CLINIC | Age: 45
End: 2025-07-26
Payer: MEDICAID

## 2025-08-04 ENCOUNTER — APPOINTMENT (OUTPATIENT)
Dept: PRIMARY CARE | Facility: CLINIC | Age: 45
End: 2025-08-04
Payer: COMMERCIAL

## 2025-08-04 VITALS
TEMPERATURE: 97.7 F | SYSTOLIC BLOOD PRESSURE: 117 MMHG | DIASTOLIC BLOOD PRESSURE: 87 MMHG | HEIGHT: 61 IN | OXYGEN SATURATION: 97 % | BODY MASS INDEX: 33.04 KG/M2 | HEART RATE: 80 BPM | WEIGHT: 175 LBS

## 2025-08-04 DIAGNOSIS — Z00.00 HEALTHCARE MAINTENANCE: Primary | ICD-10-CM

## 2025-08-04 DIAGNOSIS — I10 HYPERTENSION, UNSPECIFIED TYPE: ICD-10-CM

## 2025-08-04 DIAGNOSIS — D47.2 MONOCLONAL GAMMOPATHY OF UNDETERMINED SIGNIFICANCE: ICD-10-CM

## 2025-08-04 DIAGNOSIS — F41.1 GAD (GENERALIZED ANXIETY DISORDER): ICD-10-CM

## 2025-08-04 DIAGNOSIS — J45.20 MILD INTERMITTENT ASTHMA WITHOUT COMPLICATION (HHS-HCC): ICD-10-CM

## 2025-08-04 DIAGNOSIS — Z12.31 BREAST CANCER SCREENING BY MAMMOGRAM: ICD-10-CM

## 2025-08-04 PROBLEM — J45.909 ASTHMA: Status: RESOLVED | Noted: 2023-08-21 | Resolved: 2025-08-04

## 2025-08-04 PROCEDURE — 3008F BODY MASS INDEX DOCD: CPT | Performed by: STUDENT IN AN ORGANIZED HEALTH CARE EDUCATION/TRAINING PROGRAM

## 2025-08-04 PROCEDURE — 3079F DIAST BP 80-89 MM HG: CPT | Performed by: STUDENT IN AN ORGANIZED HEALTH CARE EDUCATION/TRAINING PROGRAM

## 2025-08-04 PROCEDURE — 99396 PREV VISIT EST AGE 40-64: CPT | Performed by: STUDENT IN AN ORGANIZED HEALTH CARE EDUCATION/TRAINING PROGRAM

## 2025-08-04 PROCEDURE — 3074F SYST BP LT 130 MM HG: CPT | Performed by: STUDENT IN AN ORGANIZED HEALTH CARE EDUCATION/TRAINING PROGRAM

## 2025-08-04 PROCEDURE — 1036F TOBACCO NON-USER: CPT | Performed by: STUDENT IN AN ORGANIZED HEALTH CARE EDUCATION/TRAINING PROGRAM

## 2025-08-04 ASSESSMENT — PAIN SCALES - GENERAL: PAINLEVEL_OUTOF10: 0-NO PAIN

## 2025-08-04 NOTE — PATIENT INSTRUCTIONS
Your blood work is up to date; no need for additional draw at this appointment    I recommend the following shots:  flu and COVID in the fall    I have ordered your mammogram today. Please call [phone] to schedule this at a convenient time and location. The nearest breast center to my office is at [facility].      I have filled out your required forms and ordered the appropriate tests/blood work, if applicable.

## 2025-08-04 NOTE — PROGRESS NOTES
"Subjective   Patient ID: Jody Richardson is a 44 y.o. female who presents for No chief complaint on file..  History of Present Illness  Jody Richardson is a 44 year old female who presents for CPE and employment health clearance forms.    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 She needs to be cleared as free of communicable diseases. Her tetanus, pneumonia, and flu vaccinations are up to date, with the pneumonia shot received in 2023. She has had MMR and TB testing previously, although she did not complete the blood work ordered at that time.    She is currently receiving Xolair injections. She is followed for monoclonal gammopathy of undetermined significance (MGUS) and undergoes blood work every six months. Her last evaluation in March showed stable results, and her next appointment is scheduled for October 3rd.    She is on prazosin for nightmares and Zoloft, which was renewed in May. She also takes medication for hypertension, which she needs refilled.      PMHx, FHx, Social Hx, Surg Hx personally reviewed at this appointment. No pertinent findings and/or changes from prior (if applicable).    ROS: Unless specified above, pt denies wt gain/loss f/c HA LoC CP SOB NVDC. See HPI above, and scanned sheet (if applicable). All other systems are reviewed and are without complaint.     Objective     /87   Pulse 80   Temp 36.5 °C (97.7 °F)   Ht (!) 1.549 m (5' 1\")   Wt 79.4 kg (175 lb)   SpO2 97%   BMI 33.07 kg/m²      Physical Exam  Gen: well developed in NAD. AAO x3.  HEENT: NC/AT. Anicteric sclera, symmetric pupils. MMM no thrush.  Neck: Soft, supple. No LAD. No goiter.   CV: RRR nl s1s2 no m/r/g  Pulm: CTAB no w/r/r, good air exchange  GI: soft NTND BS+ no hsm  Ext: WWP no edema  Neuro: II-XII grossly intact, nonfocal systemic findings  MSK: 5/5 strength b/l UE and LE  Gait: unremarkable     Current Outpatient " Medications   Medication Instructions    albuterol (Ventolin HFA) 90 mcg/actuation inhaler 2 puffs, inhalation, Every 4 hours PRN    buPROPion XL (Wellbutrin XL) 300 mg 24 hr tablet Daily RT    busPIRone (BUSPAR) 5 mg, 4 times daily    EPINEPHrine (EPIPEN) 0.3 mg, intramuscular, As needed, Call 911 after use.    famotidine (Pepcid) 20 mg tablet 1 tablet, Every 12 hours    hydroCHLOROthiazide (HYDRODIURIL) 25 mg, oral, Daily    hydrOXYzine HCL (ATARAX) 25 mg, 3 times daily PRN    inhalational spacing device (Aerochamber MV) inhaler Use as instructed    levocetirizine (Xyzal) 5 mg tablet 2 tablets, 2 times daily    LORazepam (Ativan) 0.5 mg tablet take one tablet by mouth once a day as needed    olopatadine (Patanol) 0.1 % ophthalmic solution 1 drop, Both Eyes, 2 times daily PRN    prazosin (Minipress) 5 mg capsule 2 capsules, Nightly    ProAir RespiClick 90 mcg/actuation aerosol powdr breath activated inhaler inhalation    sertraline (ZOLOFT) 100 mg, oral, 2 times daily    spironolacton-hydrochlorothiaz (Aldactazide) 25-25 mg tablet 1 tablet    tretinoin (Retin-A) 0.025 % cream 1 Application    Xolair 300 mg, subcutaneous, Every 28 days        Lab Results   Component Value Date    WBC 5.1 07/07/2023    HGB 12.9 07/07/2023    HCT 40.2 07/07/2023     07/07/2023    ALT 20 07/07/2023    AST 24 07/07/2023     07/07/2023    K 4.0 07/07/2023     07/07/2023    CREATININE 0.61 07/07/2023    BUN 10 07/07/2023    CO2 21 07/07/2023    TSH 2.69 04/29/2021    INR 1.1 01/23/2023     par     US PELVIS  MRN: [id number]  Patient Name: JENNIFER HURTADO     STUDY:  US PELVIS;  2/10/2023 3:59 pm     INDICATION:  History of MGUS and endrometriosis, possible cyst on L overy noted on  PET scan  D47.2: MGUS (monoclonal gammopathy of unknown significance)  D56.9: Thalassemia N83.209: Ovarian cyst.     COMPARISON:  None.     ACCESSION NUMBER(S):  [id number]     ORDERING CLINICIAN:  EVELIA ALTMAN     TECHNIQUE:  Multiple  multiplanar static gray scale, color and spectral waveform  sonographic images of the pelvis were obtained.  Transabdominal and  endovaginal ultrasound was performed.     FINDINGS:  UTERUS:  Surgically absent.     RIGHT ADNEXA:  Right ovary is not identified on today's examination.     LEFT ADNEXA:  Left ovary is not identified on today's examination.     CUL DE SAC:  No significant free fluid. No solid or cystic mass.     IMPRESSION:  1.  Status post hysterectomy. Ovaries not identified.         Assessment & Plan  CPE completed.    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 No communicable diseases or medication issues affecting work ability.  - Complete occupational health clearance form for employment at [employer].    Monoclonal gammopathy of undetermined significance (MGUS)  MGUS monitored by Dr. Wiley with stable lab results. No treatment needed, regular monitoring required.  - Continue follow-up with Dr. Wiley every six months.  - Next appointment with Dr. Wiley scheduled for October 3rd.    Hypertension  Hypertension requires medication management.  - Renew blood pressure medication prescription.    Asthma and allergic disorder (on Xolair)  Asthma and allergic disorder managed with Xolair. No occupational health clearance concerns.    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 Continued use necessary with blood pressure medication.    Depression  Depression managed with Zoloft. Medication supply adequate.    # Health Maintenance  - routine blood work  - Colon Cancer Screening: Not yet indicated   - Mammogram: due, ordered today   - DEXA: Not yet indicated   - Immunizations:  flu and COVID this fall          Marck Driver MD       This medical note was created with the assistance of artificial intelligence (AI) for documentation purposes. The content has been reviewed and confirmed by the healthcare provider for  accuracy and completeness. Patient consented to the use of audio recording and use of AI during their visit.

## 2025-08-06 ENCOUNTER — HOSPITAL ENCOUNTER (OUTPATIENT)
Dept: RADIOLOGY | Facility: CLINIC | Age: 45
End: 2025-08-06
Payer: COMMERCIAL

## 2025-08-19 ENCOUNTER — SPECIALTY PHARMACY (OUTPATIENT)
Dept: PHARMACY | Facility: CLINIC | Age: 45
End: 2025-08-19

## 2025-08-19 PROCEDURE — RXMED WILLOW AMBULATORY MEDICATION CHARGE

## 2025-08-25 ENCOUNTER — PHARMACY VISIT (OUTPATIENT)
Dept: PHARMACY | Facility: CLINIC | Age: 45
End: 2025-08-25
Payer: MEDICAID

## 2025-08-26 ENCOUNTER — APPOINTMENT (OUTPATIENT)
Dept: RADIOLOGY | Facility: CLINIC | Age: 45
End: 2025-08-26
Payer: COMMERCIAL

## 2025-08-30 DIAGNOSIS — J45.909 ASTHMA, UNSPECIFIED ASTHMA SEVERITY, UNSPECIFIED WHETHER COMPLICATED, UNSPECIFIED WHETHER PERSISTENT (HHS-HCC): ICD-10-CM

## 2025-09-01 RX ORDER — ALBUTEROL SULFATE 90 UG/1
2 INHALANT RESPIRATORY (INHALATION) EVERY 4 HOURS PRN
Qty: 18 G | Refills: 0 | Status: SHIPPED | OUTPATIENT
Start: 2025-09-01

## 2025-09-11 ENCOUNTER — APPOINTMENT (OUTPATIENT)
Dept: RADIOLOGY | Facility: CLINIC | Age: 45
End: 2025-09-11
Payer: COMMERCIAL